# Patient Record
Sex: MALE | Race: WHITE | Employment: FULL TIME | URBAN - METROPOLITAN AREA
[De-identification: names, ages, dates, MRNs, and addresses within clinical notes are randomized per-mention and may not be internally consistent; named-entity substitution may affect disease eponyms.]

---

## 2018-09-03 ENCOUNTER — HOSPITAL ENCOUNTER (EMERGENCY)
Facility: CLINIC | Age: 23
Discharge: HOME | End: 2018-09-03
Attending: FAMILY MEDICINE | Admitting: FAMILY MEDICINE
Payer: COMMERCIAL

## 2018-09-03 VITALS
HEART RATE: 79 BPM | TEMPERATURE: 98.6 F | SYSTOLIC BLOOD PRESSURE: 141 MMHG | BODY MASS INDEX: 32.41 KG/M2 | WEIGHT: 210 LBS | RESPIRATION RATE: 14 BRPM | OXYGEN SATURATION: 99 % | DIASTOLIC BLOOD PRESSURE: 85 MMHG

## 2018-09-03 DIAGNOSIS — G56.02 CARPAL TUNNEL SYNDROME OF LEFT WRIST: ICD-10-CM

## 2018-09-03 PROCEDURE — 99282 EMERGENCY DEPT VISIT SF MDM: CPT | Mod: Z6 | Performed by: FAMILY MEDICINE

## 2018-09-03 PROCEDURE — 99282 EMERGENCY DEPT VISIT SF MDM: CPT | Performed by: FAMILY MEDICINE

## 2018-09-03 NOTE — ED PROVIDER NOTES
History     Chief Complaint   Patient presents with     Numbness     awoke today with a numb hand. No pain.      HPI  Gustavo Chappell is a 23 year old male, past medical history significant for constipation, persistent viral warts, began 2 days ago presents to the emergency department with concerns of numbness involving his left hand upon awakening today.  He has had numbness in his hands occasionally upon awakening if he sleeps on them incorrectly.  He is a left-hand dominant .  Has never noticed the symptoms when driving.  When he awoke this morning his entire left hand felt numb and this has decreased to only involve his fourth and fifth digits.  He notes no loss of strength.  There is no associated headache visual change, nausea, vomiting, chest pain shortness of air, etc.      Problem List:    Patient Active Problem List    Diagnosis Date Noted     Constipation 04/13/2006     Priority: Medium     Problem list name updated by automated process. Provider to review       Abdominal pain, generalized 04/13/2006     Priority: Medium     Viral warts 02/02/2006     Priority: Medium     Problem list name updated by automated process. Provider to review       Other specified disorder of skin 02/02/2006     Priority: Medium        Past Medical History:    Past Medical History:   Diagnosis Date     Allergic urticaria 2004       Past Surgical History:    No past surgical history on file.    Family History:    Family History   Problem Relation Age of Onset     Diabetes Father      Hypertension Father      Lipids Father      Cancer Paternal Grandmother      Lung cancer     Cerebrovascular Disease Paternal Grandfather      Neurologic Disorder Paternal Grandfather      parkinson's     Hypertension Maternal Grandmother      Hypertension Maternal Grandfather        Social History:  Marital Status:  Single [1]  Social History   Substance Use Topics     Smoking status: Never Smoker     Smokeless tobacco: Not on  file     Alcohol use No        Medications:      NO ACTIVE MEDICATIONS         Review of Systems   All other systems reviewed and are negative.      Physical Exam   BP: 141/85  Pulse: 79  Temp: 98.6  F (37  C)  Resp: 14  Weight: 95.3 kg (210 lb)  SpO2: 99 %      Physical Exam   Constitutional: He is oriented to person, place, and time. He appears well-developed and well-nourished.   HENT:   Head: Normocephalic and atraumatic.   Right Ear: External ear normal.   Left Ear: External ear normal.   Nose: Nose normal.   Mouth/Throat: Oropharynx is clear and moist.   Eyes: Conjunctivae and EOM are normal. Pupils are equal, round, and reactive to light.   Neck: Normal range of motion. Neck supple.   Cardiovascular: Normal rate, regular rhythm, normal heart sounds and intact distal pulses.    Musculoskeletal:   There is no muscular atrophy in the hands, no fasciculation.  Tinel's test is positive on the left side.  Phalen's test is positive on the left side as well.   Neurological: He is alert and oriented to person, place, and time. He has normal reflexes. He displays normal reflexes. No cranial nerve deficit. He exhibits normal muscle tone. Coordination normal.   Reflex Scores:       Tricep reflexes are 2+ on the right side and 2+ on the left side.       Bicep reflexes are 2+ on the right side and 2+ on the left side.       Brachioradialis reflexes are 2+ on the right side and 2+ on the left side.       Patellar reflexes are 2+ on the right side and 2+ on the left side.       Achilles reflexes are 2+ on the right side and 2+ on the left side.  Nursing note and vitals reviewed.      ED Course     ED Course     Procedures               Critical Care time:  none               No results found for this or any previous visit (from the past 24 hour(s)).    Medications - No data to display  1:24 PM  23-year-old male who presents for evaluation of left hand numbness as discussed in the HPI.  Physical exam is notable for positive  Tinel's and Phalen's test on the left side.  Differential diagnostic considerations are reviewed with the patient.  I think the most likely explanation for his presentation today relates to transient compression of the risk, carpal tunnel type compression.  He has had symptoms like this in the past, nothing problematic this is the most prolonged episode.  I recommended that he simply try a carpal tunnel brace with sleeping, and his symptoms are progressive he should follow-up in clinic with his primary care provider for further evaluation potential hand surgery referral.  Assessments & Plan (with Medical Decision Making)   Assessment and plan with medical decision making at the time stamp above.      Disclaimer: This note consists of symbols derived from keyboarding, dictation and/or voice recognition software. As a result, there may be errors in the script that have gone undetected. Please consider this when interpreting information found in this chart.      I have reviewed the nursing notes.    I have reviewed the findings, diagnosis, plan and need for follow up with the patient.          New Prescriptions    No medications on file       Final diagnoses:   Carpal tunnel syndrome of left wrist       9/3/2018   [facility] EMERGENCY DEPARTMENT     Dalton Gtz MD  09/03/18 4308

## 2018-09-03 NOTE — ED AVS SNAPSHOT
[facility] Emergency Department    [address] [address]    Phone:  [phone]    Fax:  [phone]                                       Gustavo Chappell   MRN: [id number]    Department:  [facility] Emergency Department   Date of Visit:  9/3/2018           Patient Information     Date Of Birth          1995        Your diagnoses for this visit were:     Carpal tunnel syndrome of left wrist        You were seen by Dalton Gtz MD.      Follow-up Information     Schedule an appointment as soon as possible for a visit with Livan Burden MD.    Specialty:  Family Practice    Why:  As needed, If symptoms worsen    Contact information:    [address] [facility] [address]  [phone]          Discharge Instructions         Carpal Tunnel Syndrome Prevention Tips  Carpal tunnel syndrome is a painful condition in the hand and wrist. It occurs when there is too much pressure on the median nerve at the wrist. The median nerve runs from your forearm to the palm of your hand. It may get squeezed or pressed when it passes through the carpal tunnel from your wrist to your hand. You may then feel numbness, tingling, pain, or weakness in your hand and up your forearm.  Doing the same hand activities over and over can put you at higher risk for carpal tunnel syndrome. But you can reduce your risk. Learn how to change the way you use your hands. Below are tips for at home and on the job. Also follow the hand and wrist safety policies at your workplace.      Keep your wrist in a straight (neutral) position when exercising.      Keep your wrist in neutral  Keep a straight (neutral) wrist position as often as you can. Don t use your wrist in a bent (flexed) position for long periods of time. This includes extended or twisted positions.  When you sleep, don't have your wrist flexed (don't sleep all curled up on your side). And don't put extra pressure on your wrist for long  periods of time (don't sleep on your stomach with your hands under you).  Watch your   Don t use only your thumb and index finger to grasp or lift something. This can put stress on your wrist. When you can, use your whole hand and all its fingers to grasp an object.  Minimize repetition  Don t move your arms or hands the same way for long periods of time. And don't hold an object in the same way for long periods of time. Even simple, light tasks can cause injury this way. Instead, switch tasks or switch hands.  Rest your hands  Give your hands a break from time to time with a rest. Even a few minutes once an hour can help.  Reduce speed and force  Slow down when you do a forceful, repetitive motion. This gives your wrist time to recover from the effort. Use power tools to help reduce the force.  Strengthen the muscles  Weak muscles may lead to a poor wrist or arm position. Exercises will make your hand and arm muscles stronger. This can help you keep a better position.  Date Last Reviewed: 1/1/2018 2000-2017 [employer]. [address] [place] [address]. All rights reserved. This information is not intended as a substitute for professional medical care. Always follow your healthcare professional's instructions.          Understanding Carpal Tunnel Syndrome    The carpal tunnel is a narrow space inside the wrist. It is ringed by bone and a band of tough tissue called the transverse carpal ligament. A major nerve called the median nerve runs from the forearm into the hand through the carpal tunnel. Tendons also run through the carpal tunnel.  With carpal tunnel syndrome, the tendons or nearby tissues within the carpal tunnel may swell or thicken. Or the transverse carpal ligament may harden and shorten. This narrows the space in the carpal tunnel and puts pressure on the median nerve. This pressure leads to tingling and numbness of the hand and wrist. In time, the condition can make even  simple tasks hard to do.  What causes carpal tunnel syndrome?  Doctors aren t entirely clear why the condition occurs. Certain things may make a person more likely to have it. These include:    Being female    Being pregnant    Being overweight    Having diabetes or rheumatoid arthritis  Symptoms of carpal tunnel syndrome  Symptoms often come and go. At first, symptoms may occur mainly at night. Later, they may be noticed during the day as well. They may get worse with activities such as driving, reading, typing, or holding a phone. Symptoms can include:    Tingling and numbness in the hand or wrist    Sharp pain that shoots up the arm or down to the fingers    Hand stiffness or cramping, especially in the morning    Trouble making a fist    Hand weakness and clumsiness  Treatment for carpal tunnel syndrome  Certain treatments help reduce the pressure on the median nerve and relieve symptoms. Choices for treatment may include one or more of the following:    Wrist splint. This involves wearing a special brace on the wrist and hand. The splint holds the wrist straight, in a neutral position. This helps keep the carpal tunnel as open as possible.    Cortisone shots. Cortisone is a medicine that helps reduce swelling. It is injected directly into the wrist. It helps shrink tissues inside the carpal tunnel. This relieves symptoms for a time.    Pain medicines. You may take over-the-counter or prescription medicines to help reduce swelling and relieve symptoms.    Surgery. If the condition doesn t respond to other treatments and doesn t go away on its own, you may need surgery. During surgery, the surgeon cuts the transverse carpal ligament to relieve pressure on the median nerve.     When to call your healthcare provider  Call your healthcare provider right away if you have any of these:    Fever of 100.4 F (38 C) or higher, or as directed    Symptoms that don t get better, or get worse    New symptoms   Date Last  Reviewed: 3/10/2016    0352-8583 [employer]. [address] [place] [address], [place] [address]. All rights reserved. This information is not intended as a substitute for professional medical care. Always follow your healthcare professional's instructions.      Carpal tunnel wrist splint on an as-needed basis.  If you find that symptoms are recurrent or disruptive please make an appointment with primary care provider for further evaluation and potential hand surgery referral.    24 Hour Appointment Hotline       To make an appointment at any [facility] clinic, call [phone] ([phone]). If you don't have a family doctor or clinic, we will help you find one. [facility] clinics are conveniently located to serve the needs of you and your family.             Review of your medicines      Our records show that you are taking the medicines listed below. If these are incorrect, please call your family doctor or clinic.        Dose / Directions Last dose taken    NO ACTIVE MEDICATIONS        .   Refills:  0                Orders Needing Specimen Collection     None      Pending Results     No orders found from 9/1/2018 to 9/4/2018.            Pending Culture Results     No orders found from 9/1/2018 to 9/4/2018.            Pending Results Instructions     If you had any lab results that were not finalized at the time of your Discharge, you can call the ED Lab Result RN at [phone]. You will be contacted by this team for any positive Lab results or changes in treatment. The nurses are available 7 days a week from 10A to 6:30P.  You can leave a message 24 hours per day and they will return your call.        Test Results From Your Hospital Stay               Thank you for choosing [facility]       Thank you for choosing [facility] for your care. Our goal is always to provide you with excellent care. Hearing back from our patients is one way we can continue to improve our services. Please take a few minutes to  "complete the written survey that you may receive in the mail after you visit with us. Thank you!        [employer] Information     E[employer] lets you send messages to your doctor, view your test results, renew your prescriptions, schedule appointments and more. To sign up, go to [email or web address] . Click on \"Log in\" on the left side of the screen, which will take you to the Welcome page. Then click on \"Sign up Now\" on the right side of the page.     You will be asked to enter the access code listed below, as well as some personal information. Please follow the directions to create your username and password.     Your access code is: [email or web address]  Expires: 2018  1:21 PM     Your access code will  in 90 days. If you need help or a new code, please call your [facility] clinic or [phone].        Care EveryWhere ID     This is your Care EveryWhere ID. This could be used by other organizations to access your [facility] medical records  [id number]        Equal Access to Services     [facility]: Hadii zehra hernandezo Sojuju, waaxda luqadaha, qaybta kaalmacurt aderell, alma brown . So [facility] [phone].    ATENCIÓN: Si habla español, tiene a saleh disposición servicios gratuitos de asistencia lingüística. Llame al [phone].    We comply with applicable federal civil rights laws and [place] laws. We do not discriminate on the basis of race, color, national origin, age, disability, sex, sexual orientation, or gender identity.            After Visit Summary       This is your record. Keep this with you and show to your community pharmacist(s) and doctor(s) at your next visit.                  "

## 2018-09-03 NOTE — DISCHARGE INSTRUCTIONS
Carpal Tunnel Syndrome Prevention Tips  Carpal tunnel syndrome is a painful condition in the hand and wrist. It occurs when there is too much pressure on the median nerve at the wrist. The median nerve runs from your forearm to the palm of your hand. It may get squeezed or pressed when it passes through the carpal tunnel from your wrist to your hand. You may then feel numbness, tingling, pain, or weakness in your hand and up your forearm.  Doing the same hand activities over and over can put you at higher risk for carpal tunnel syndrome. But you can reduce your risk. Learn how to change the way you use your hands. Below are tips for at home and on the job. Also follow the hand and wrist safety policies at your workplace.      Keep your wrist in a straight (neutral) position when exercising.      Keep your wrist in neutral  Keep a straight (neutral) wrist position as often as you can. Don t use your wrist in a bent (flexed) position for long periods of time. This includes extended or twisted positions.  When you sleep, don't have your wrist flexed (don't sleep all curled up on your side). And don't put extra pressure on your wrist for long periods of time (don't sleep on your stomach with your hands under you).  Watch your   Don t use only your thumb and index finger to grasp or lift something. This can put stress on your wrist. When you can, use your whole hand and all its fingers to grasp an object.  Minimize repetition  Don t move your arms or hands the same way for long periods of time. And don't hold an object in the same way for long periods of time. Even simple, light tasks can cause injury this way. Instead, switch tasks or switch hands.  Rest your hands  Give your hands a break from time to time with a rest. Even a few minutes once an hour can help.  Reduce speed and force  Slow down when you do a forceful, repetitive motion. This gives your wrist time to recover from the effort. Use power tools to  help reduce the force.  Strengthen the muscles  Weak muscles may lead to a poor wrist or arm position. Exercises will make your hand and arm muscles stronger. This can help you keep a better position.  Date Last Reviewed: 1/1/2018 2000-2017 [employer]. [address] [place] [address]. All rights reserved. This information is not intended as a substitute for professional medical care. Always follow your healthcare professional's instructions.          Understanding Carpal Tunnel Syndrome    The carpal tunnel is a narrow space inside the wrist. It is ringed by bone and a band of tough tissue called the transverse carpal ligament. A major nerve called the median nerve runs from the forearm into the hand through the carpal tunnel. Tendons also run through the carpal tunnel.  With carpal tunnel syndrome, the tendons or nearby tissues within the carpal tunnel may swell or thicken. Or the transverse carpal ligament may harden and shorten. This narrows the space in the carpal tunnel and puts pressure on the median nerve. This pressure leads to tingling and numbness of the hand and wrist. In time, the condition can make even simple tasks hard to do.  What causes carpal tunnel syndrome?  Doctors aren t entirely clear why the condition occurs. Certain things may make a person more likely to have it. These include:    Being female    Being pregnant    Being overweight    Having diabetes or rheumatoid arthritis  Symptoms of carpal tunnel syndrome  Symptoms often come and go. At first, symptoms may occur mainly at night. Later, they may be noticed during the day as well. They may get worse with activities such as driving, reading, typing, or holding a phone. Symptoms can include:    Tingling and numbness in the hand or wrist    Sharp pain that shoots up the arm or down to the fingers    Hand stiffness or cramping, especially in the morning    Trouble making a fist    Hand weakness and  clumsiness  Treatment for carpal tunnel syndrome  Certain treatments help reduce the pressure on the median nerve and relieve symptoms. Choices for treatment may include one or more of the following:    Wrist splint. This involves wearing a special brace on the wrist and hand. The splint holds the wrist straight, in a neutral position. This helps keep the carpal tunnel as open as possible.    Cortisone shots. Cortisone is a medicine that helps reduce swelling. It is injected directly into the wrist. It helps shrink tissues inside the carpal tunnel. This relieves symptoms for a time.    Pain medicines. You may take over-the-counter or prescription medicines to help reduce swelling and relieve symptoms.    Surgery. If the condition doesn t respond to other treatments and doesn t go away on its own, you may need surgery. During surgery, the surgeon cuts the transverse carpal ligament to relieve pressure on the median nerve.     When to call your healthcare provider  Call your healthcare provider right away if you have any of these:    Fever of 100.4 F (38 C) or higher, or as directed    Symptoms that don t get better, or get worse    New symptoms   Date Last Reviewed: 3/10/2016    7548-9165 [employer]. [address] [place] [address]. All rights reserved. This information is not intended as a substitute for professional medical care. Always follow your healthcare professional's instructions.      Carpal tunnel wrist splint on an as-needed basis.  If you find that symptoms are recurrent or disruptive please make an appointment with primary care provider for further evaluation and potential hand surgery referral.

## 2018-09-03 NOTE — ED TRIAGE NOTES
"Reports waking with a \"numb hand this morning.\" Left hand. Also reports being hit with a box in left shoulder on Friday while at work.     "

## 2018-09-03 NOTE — ED AVS SNAPSHOT
[facility] Emergency Department    [address] [address]    Phone:  [phone]    Fax:  [phone]                                       Gustavo Chappell   MRN: [id number]    Department:  [facility] Emergency Department   Date of Visit:  9/3/2018           After Visit Summary Signature Page     I have received my discharge instructions, and my questions have been answered. I have discussed any challenges I see with this plan with the nurse or doctor.    ..........................................................................................................................................  Patient/Patient Representative Signature      ..........................................................................................................................................  Patient Representative Print Name and Relationship to Patient    ..................................................               ................................................  Date                                            Time    ..........................................................................................................................................  Reviewed by Signature/Title    ...................................................              ..............................................  Date                                                            Time          22EPIC Rev 08/18

## 2018-09-03 NOTE — ED NOTES
"Woke up this a.m. With numbness and tingling in whole left hand. Currently numbess only in left ring and pinky finger. Denies any recent injury or trauma. Was \"selina and drank a lot last night, could have slept on it wrong, did wake up on left side.\"  No history of carpal tunnel or tendonitis. 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  "

## 2018-09-09 ENCOUNTER — HEALTH MAINTENANCE LETTER (OUTPATIENT)
Age: 23
End: 2018-09-09

## 2018-09-30 ENCOUNTER — HEALTH MAINTENANCE LETTER (OUTPATIENT)
Age: 23
End: 2018-09-30

## 2019-03-06 ENCOUNTER — OFFICE VISIT (OUTPATIENT)
Dept: FAMILY MEDICINE | Facility: CLINIC | Age: 24
End: 2019-03-06
Payer: COMMERCIAL

## 2019-03-06 VITALS
HEIGHT: 68 IN | HEART RATE: 64 BPM | BODY MASS INDEX: 31.07 KG/M2 | SYSTOLIC BLOOD PRESSURE: 130 MMHG | RESPIRATION RATE: 16 BRPM | TEMPERATURE: 99.1 F | WEIGHT: 205 LBS | OXYGEN SATURATION: 98 % | DIASTOLIC BLOOD PRESSURE: 80 MMHG

## 2019-03-06 DIAGNOSIS — Z23 ENCOUNTER FOR IMMUNIZATION: ICD-10-CM

## 2019-03-06 DIAGNOSIS — Z00.00 ROUTINE HISTORY AND PHYSICAL EXAMINATION OF ADULT: Primary | ICD-10-CM

## 2019-03-06 PROCEDURE — 90471 IMMUNIZATION ADMIN: CPT | Performed by: FAMILY MEDICINE

## 2019-03-06 PROCEDURE — 99395 PREV VISIT EST AGE 18-39: CPT | Performed by: FAMILY MEDICINE

## 2019-03-06 PROCEDURE — 90715 TDAP VACCINE 7 YRS/> IM: CPT | Performed by: FAMILY MEDICINE

## 2019-03-06 ASSESSMENT — MIFFLIN-ST. JEOR: SCORE: 1901.43

## 2019-03-06 NOTE — PATIENT INSTRUCTIONS
Preventive Health Recommendations  Male Ages 21 - 25     Yearly exam:             See your health care provider every year in order to  o   Review health changes.   o   Discuss preventive care.    o   Review your medicines if your doctor has prescribed any.    You should be tested each year for STDs (sexually transmitted diseases).     Talk to your provider about cholesterol testing.      If you are at risk for diabetes, you should have a diabetes test (fasting glucose).    Shots: Get a flu shot each year. Get a tetanus shot every 10 years.     Nutrition:    Eat at least 5 servings of fruits and vegetables daily.     Eat whole-grain bread, whole-wheat pasta and brown rice instead of white grains and rice.     Get adequate calcium and Vitamin D.     Lifestyle    Exercise for at least 150 minutes a week (30 minutes a day, 5 days a week). This will help you control your weight and prevent disease.     Limit alcohol to one drink per day.     No smoking.     Wear sunscreen to prevent skin cancer.     See your dentist every six months for an exam and cleaning.           Thank you for choosing [facility].  You may be receiving a survey in the mail from Wanda Julio regarding your visit today.  Please take a few minutes to complete and return the survey to let us know how we are doing.      If you have questions or concerns, please contact us via Cswitch or you can contact your care team at [phone].    Our Clinic hours are:  Monday 6:40 am  to 7:00 pm  Tuesday -Friday 6:40 am to 5:00 pm    The [place] outpatient lab hours are:  Monday - Friday 6:10 am to 4:45 pm  Saturdays 7:00 am to 11:00 am  Appointments are required, call [phone]    If you have clinical questions after hours or would like to schedule an appointment,  call the clinic at [phone].    ASSESSMENT/PLAN:   (Z00.00) Routine history and physical examination of adult  (primary encounter diagnosis)  Comment:   Plan:   COUNSELING:  Reviewed  "preventive health counseling, as reflected in patient instructions       Regular exercise       Healthy diet/nutrition       Vision screening    BP Readings from Last 1 Encounters:   03/06/19 144/84     Estimated body mass index is 31.63 kg/m  as calculated from the following:    Height as of this encounter: 1.715 m (5' 7.5\").    Weight as of this encounter: 93 kg (205 lb).   reports that  has never smoked. He uses smokeless tobacco.     Counseling Resources:  ATP IV Guidelines  Pooled Cohorts Equation Calculator  FRAX Risk Assessment  ICSI Preventive Guidelines  Dietary Guidelines for Americans, 2010  USDA's MyPlate  ASA Prophylaxis  Lung CA Screening  Monitor and record the BP at rest and the goal for the average should be under 130/80.   Use the non drug therapies.       "

## 2019-03-06 NOTE — PROGRESS NOTES
SUBJECTIVE:   CC: Gustavo Chappell is an 23 year old male who presents for preventive health visit.     Healthy Habits:    Do you get at least three servings of calcium containing foods daily (dairy, green leafy vegetables, etc.)? yes    Amount of exercise or daily activities, outside of work: Active with his work.    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    Do you see a dentist twice per year? yes    Do you have sleep apnea, excessive snoring or daytime drowsiness?no      Chief Complaint   Patient presents with     Physical     General physical exam.       Today's PHQ-2 Score:   PHQ-2 ( 1999 Pfizer) 3/6/2019   Q1: Little interest or pleasure in doing things 0   Q2: Feeling down, depressed or hopeless 0   PHQ-2 Score 0       Abuse: Current or Past(Physical, Sexual or Emotional)- No  Do you feel safe in your environment? Yes    Social History     Tobacco Use     Smoking status: Never Smoker     Smokeless tobacco: Current User     Tobacco comment: 1 tin per week.   Substance Use Topics     Alcohol use: Yes     Comment: During the weekends.     If you drink alcohol do you typically have >3 drinks per day or >7 drinks per week? Yes - AUDIT SCORE:  5                      Reviewed orders with patient. Reviewed health maintenance and updated orders accordingly - Yes    Reviewed and updated as needed this visit by clinical staff  Tobacco  Allergies  Meds  Med Hx  Surg Hx  Fam Hx  Soc Hx      Reviewed and updated as needed this visit by Provider    His home BP at rest is normal.     ROS:  CONSTITUTIONAL: NEGATIVE for fever, chills, change in weight  INTEGUMENTARY/SKIN: NEGATIVE for worrisome rashes, moles or lesions  EYES: NEGATIVE for vision changes or irritation  ENT: NEGATIVE for ear, mouth and throat problems  RESP: NEGATIVE for significant cough or SOB  CV: NEGATIVE for chest pain,  "palpitations or peripheral edema  GI: NEGATIVE for nausea, abdominal pain, heartburn, or change in bowel habits   male: negative for dysuria, hematuria, decreased urinary stream, erectile dysfunction, urethral discharge  MUSCULOSKELETAL: NEGATIVE for significant arthralgias or myalgia  NEURO: NEGATIVE for weakness, dizziness or paresthesias  PSYCHIATRIC: NEGATIVE for changes in mood or affect    OBJECTIVE:   /80   Pulse 64   Temp 99.1  F (37.3  C) (Tympanic)   Resp 16   Ht 1.715 m (5' 7.5\")   Wt 93 kg (205 lb)   SpO2 98%   BMI 31.63 kg/m    EXAM:  GENERAL APPEARANCE: healthy, alert and no distress  EYES: EOMI,  PERRL  HENT: ear canals and TM's normal and nose and mouth without ulcers or lesions  NECK: no adenopathy, no asymmetry, masses, or scars and thyroid normal to palpation  RESP: lungs clear to auscultation - no rales, rhonchi or wheezes  CV: regular rates and rhythm, normal S1 S2, no S3 or S4 and no murmur, click or rub -  ABDOMEN:  soft, nontender, no HSM or masses and bowel sounds normal  GU_male: testicles normal without atrophy or masses, no hernias and penis normal without urethral discharge  MS: extremities normal- no gross deformities noted, no evidence of inflammation in joints, FROM in all extremities.  SKIN: no suspicious lesions or rashes  NEURO: Normal strength and tone, sensory exam grossly normal, mentation intact and speech normal  PSYCH: mentation appears normal and affect normal/bright  LYMPHATICS: No axillary, cervical, inguinal, or supraclavicular nodes      ASSESSMENT/PLAN:   (Z00.00) Routine history and physical examination of adult  (primary encounter diagnosis)  Comment:   Plan:   COUNSELING:  Reviewed preventive health counseling, as reflected in patient instructions       Regular exercise       Healthy diet/nutrition       Vision screening    BP Readings from Last 1 Encounters:   03/06/19 130/80     Estimated body mass index is 31.63 kg/m  as calculated from the " "following:    Height as of this encounter: 1.715 m (5' 7.5\").    Weight as of this encounter: 93 kg (205 lb).   reports that  has never smoked. He uses smokeless tobacco.     Counseling Resources:  ATP IV Guidelines  Pooled Cohorts Equation Calculator  FRAX Risk Assessment  ICSI Preventive Guidelines  Dietary Guidelines for Americans, 2010  USDA's MyPlate  ASA Prophylaxis  Lung CA Screening  Monitor and record the BP at rest and the goal for the average should be under 130/80.   Use the non drug therapies.     Jose Monreal MD  [facility]  "

## 2019-03-06 NOTE — NURSING NOTE
Screening Questionnaire for Adult Immunization    Are you sick today?   No   Do you have allergies to medications, food, a vaccine component or latex?   No   Have you ever had a serious reaction after receiving a vaccination?   No   Do you have a long-term health problem with heart disease, lung disease, asthma, kidney disease, metabolic disease (e.g. diabetes), anemia, or other blood disorder?   No   Do you have cancer, leukemia, HIV/AIDS, or any other immune system problem?   No   In the past 3 months, have you taken medications that affect  your immune system, such as prednisone, other steroids, or anticancer drugs; drugs for the treatment of rheumatoid arthritis, Crohn s disease, or psoriasis; or have you had radiation treatments?   No   Have you had a seizure, or a brain or other nervous system problem?   No   During the past year, have you received a transfusion of blood or blood     products, or been given immune (gamma) globulin or antiviral drug?   No   For women: Are you pregnant or is there a chance you could become        pregnant during the next month?   No   Have you received any vaccinations in the past 4 weeks?   No     Immunization questionnaire answers were all negative.        Per orders of Dr. Monreal, injection of Adacel given by Cintia Tyler. Patient instructed to remain in clinic for 15 minutes afterwards, and to report any adverse reaction to me immediately.       Screening performed by Cintia Tyler on 3/6/2019 at 10:35 AM.

## 2021-06-30 ENCOUNTER — IMMUNIZATION (OUTPATIENT)
Dept: FAMILY MEDICINE | Facility: CLINIC | Age: 26
End: 2021-06-30
Payer: OTHER GOVERNMENT

## 2021-06-30 PROCEDURE — 91301 PR COVID VAC MODERNA 100 MCG/0.5 ML IM: CPT

## 2021-06-30 PROCEDURE — 0011A PR COVID VAC MODERNA 100 MCG/0.5 ML IM: CPT

## 2021-07-25 ENCOUNTER — HEALTH MAINTENANCE LETTER (OUTPATIENT)
Age: 26
End: 2021-07-25

## 2021-07-28 ENCOUNTER — IMMUNIZATION (OUTPATIENT)
Dept: FAMILY MEDICINE | Facility: CLINIC | Age: 26
End: 2021-07-28
Attending: FAMILY MEDICINE
Payer: COMMERCIAL

## 2021-07-28 DIAGNOSIS — Z23 HIGH PRIORITY FOR 2019-NCOV VACCINE: Primary | ICD-10-CM

## 2021-07-28 PROCEDURE — 91301 COVID-19,PF,MODERNA: CPT

## 2021-07-28 PROCEDURE — 0012A COVID-19,PF,MODERNA: CPT

## 2021-09-19 ENCOUNTER — HEALTH MAINTENANCE LETTER (OUTPATIENT)
Age: 26
End: 2021-09-19

## 2022-08-21 ENCOUNTER — HEALTH MAINTENANCE LETTER (OUTPATIENT)
Age: 27
End: 2022-08-21

## 2022-11-14 ENCOUNTER — TELEPHONE (OUTPATIENT)
Dept: FAMILY MEDICINE | Facility: CLINIC | Age: 27
End: 2022-11-14

## 2022-11-14 NOTE — TELEPHONE ENCOUNTER
Patient's wife called requesting an appointment for patient as he has a sore throat for 10 days.  Patient had micaela like symptoms that started 10 days ago and now only has sore throat.  Afebrile, denies chest pain or SOA.  Reviewed home care measures.  Advised UC if symptoms worsen.  Patient does need to establish care as last visit 3/2019.  Araceli Paul RN

## 2022-11-14 NOTE — TELEPHONE ENCOUNTER
You can offer the same day on Wednesday at 4 pm with me or he can try to see a colleague.  Livan Burden MD  Family Medicine

## 2022-12-20 ENCOUNTER — OFFICE VISIT (OUTPATIENT)
Dept: FAMILY MEDICINE | Facility: CLINIC | Age: 27
End: 2022-12-20
Payer: COMMERCIAL

## 2022-12-20 VITALS
TEMPERATURE: 98.3 F | OXYGEN SATURATION: 97 % | BODY MASS INDEX: 39.1 KG/M2 | HEART RATE: 84 BPM | SYSTOLIC BLOOD PRESSURE: 130 MMHG | WEIGHT: 258 LBS | DIASTOLIC BLOOD PRESSURE: 80 MMHG | HEIGHT: 68 IN | RESPIRATION RATE: 18 BRPM

## 2022-12-20 DIAGNOSIS — Z13.1 SCREENING FOR DIABETES MELLITUS: ICD-10-CM

## 2022-12-20 DIAGNOSIS — E66.812 CLASS 2 OBESITY DUE TO EXCESS CALORIES WITHOUT SERIOUS COMORBIDITY WITH BODY MASS INDEX (BMI) OF 39.0 TO 39.9 IN ADULT: ICD-10-CM

## 2022-12-20 DIAGNOSIS — Z00.00 ROUTINE GENERAL MEDICAL EXAMINATION AT A HEALTH CARE FACILITY: Primary | ICD-10-CM

## 2022-12-20 DIAGNOSIS — E66.09 CLASS 2 OBESITY DUE TO EXCESS CALORIES WITHOUT SERIOUS COMORBIDITY WITH BODY MASS INDEX (BMI) OF 39.0 TO 39.9 IN ADULT: ICD-10-CM

## 2022-12-20 DIAGNOSIS — Z13.220 SCREENING CHOLESTEROL LEVEL: ICD-10-CM

## 2022-12-20 LAB
CHOLEST SERPL-MCNC: 160 MG/DL
HBA1C MFR BLD: 5.6 % (ref 0–5.6)
HDLC SERPL-MCNC: 41 MG/DL
LDLC SERPL CALC-MCNC: 79 MG/DL
NONHDLC SERPL-MCNC: 119 MG/DL
TRIGL SERPL-MCNC: 201 MG/DL

## 2022-12-20 PROCEDURE — 83036 HEMOGLOBIN GLYCOSYLATED A1C: CPT | Performed by: FAMILY MEDICINE

## 2022-12-20 PROCEDURE — 36415 COLL VENOUS BLD VENIPUNCTURE: CPT | Performed by: FAMILY MEDICINE

## 2022-12-20 PROCEDURE — 80061 LIPID PANEL: CPT | Performed by: FAMILY MEDICINE

## 2022-12-20 PROCEDURE — 99385 PREV VISIT NEW AGE 18-39: CPT | Performed by: FAMILY MEDICINE

## 2022-12-20 ASSESSMENT — ENCOUNTER SYMPTOMS
PALPITATIONS: 0
JOINT SWELLING: 0
NERVOUS/ANXIOUS: 0
CONSTIPATION: 0
WEAKNESS: 0
FEVER: 0
ARTHRALGIAS: 0
COUGH: 0
PARESTHESIAS: 0
HEMATURIA: 0
HEARTBURN: 0
EYE PAIN: 0
HEADACHES: 0
ABDOMINAL PAIN: 0
MYALGIAS: 0
CHILLS: 0
SHORTNESS OF BREATH: 0
DIARRHEA: 0
DIZZINESS: 0
DYSURIA: 0
HEMATOCHEZIA: 0
FREQUENCY: 0
NAUSEA: 0
SORE THROAT: 0

## 2022-12-20 ASSESSMENT — PAIN SCALES - GENERAL: PAINLEVEL: NO PAIN (0)

## 2022-12-20 NOTE — PROGRESS NOTES
SUBJECTIVE:   CC: Gustavo is an 27 year old who presents for preventative health visit.     Patient has been advised of split billing requirements and indicates understanding: Yes  Healthy Habits:     Getting at least 3 servings of Calcium per day:  Yes    Bi-annual eye exam:  Yes    Dental care twice a year:  Yes    Sleep apnea or symptoms of sleep apnea:  None    Diet:  Regular (no restrictions)    Frequency of exercise:  2-3 days/week    Duration of exercise:  15-30 minutes    Taking medications regularly:  Yes    Medication side effects:  Not applicable    PHQ-2 Total Score: 0    Additional concerns today:  No       27 year old male who presents to clinic for annual exam.       Tobacco use 3-4 tins per month.   Reports some months won't chew, other months he will.   Not interested in nicotine treatment.     Reports exercise has decreased after birth of son who is 7 months.       Today's PHQ-2 Score:   PHQ-2 ( 1999 Pfizer) 12/20/2022   Q1: Little interest or pleasure in doing things 0   Q2: Feeling down, depressed or hopeless 0   PHQ-2 Score 0   PHQ-2 Total Score (12-17 Years)- Positive if 3 or more points; Administer PHQ-A if positive -   Q1: Little interest or pleasure in doing things Not at all   Q2: Feeling down, depressed or hopeless Not at all   PHQ-2 Score 0       Have you ever done Advance Care Planning? (For example, a Health Directive, POLST, or a discussion with a medical provider or your loved ones about your wishes): No, advance care planning information given to patient to review.  Patient declined advance care planning discussion at this time.    Social History     Tobacco Use     Smoking status: Never     Smokeless tobacco: Current     Types: Chew     Tobacco comments:     2-3 tins a month   Substance Use Topics     Alcohol use: Yes     Comment: During the weekends.     If you drink alcohol do you typically have >3 drinks per day or >7 drinks per week? No    Alcohol Use 12/20/2022   Prescreen: >3  "drinks/day or >7 drinks/week? No   Prescreen: >3 drinks/day or >7 drinks/week? -   AUDIT SCORE  -       Last PSA: No results found for: PSA    Reviewed orders with patient. Reviewed health maintenance and updated orders accordingly - Yes      Reviewed and updated as needed this visit by clinical staff   Tobacco  Allergies  Meds  Problems  Med Hx  Surg Hx  Fam Hx          Reviewed and updated as needed this visit by Provider   Tobacco  Allergies  Meds  Problems  Med Hx  Surg Hx  Fam Hx           Review of Systems   Constitutional: Negative for chills and fever.   HENT: Negative for congestion, ear pain, hearing loss and sore throat.    Eyes: Negative for pain and visual disturbance.   Respiratory: Negative for cough and shortness of breath.    Cardiovascular: Negative for chest pain, palpitations and peripheral edema.   Gastrointestinal: Negative for abdominal pain, constipation, diarrhea, heartburn, hematochezia and nausea.   Genitourinary: Negative for dysuria, frequency, genital sores, hematuria, impotence, penile discharge and urgency.   Musculoskeletal: Negative for arthralgias, joint swelling and myalgias.   Skin: Negative for rash.   Neurological: Negative for dizziness, weakness, headaches and paresthesias.   Psychiatric/Behavioral: Negative for mood changes. The patient is not nervous/anxious.        OBJECTIVE:   /80 (BP Location: Right arm, Patient Position: Chair, Cuff Size: Adult Large)   Pulse 84   Temp 98.3  F (36.8  C) (Tympanic)   Resp 18   Ht 1.715 m (5' 7.5\")   Wt 117 kg (258 lb)   SpO2 97%   BMI 39.81 kg/m      Physical Exam  GENERAL: healthy, alert and no distress  EYES: Eyes grossly normal to inspection, PERRL and conjunctivae and sclerae normal  HENT: ear canals and TM's normal, nose and mouth without ulcers or lesions  NECK: no adenopathy, no asymmetry, masses, or scars and thyroid normal to palpation  RESP: lungs clear to auscultation - no rales, rhonchi or " wheezes  CV: regular rate and rhythm, normal S1 S2, no S3 or S4, no murmur, click or rub, no peripheral edema and peripheral pulses strong  ABDOMEN: soft, nontender, no hepatosplenomegaly, no masses and bowel sounds normal  MS: no gross musculoskeletal defects noted, no edema  SKIN: no suspicious lesions or rashes  NEURO: Normal strength and tone, mentation intact and speech normal  PSYCH: mentation appears normal, affect normal/bright      ASSESSMENT/PLAN:   Gustavo was seen today for physical and establish care.    Diagnoses and all orders for this visit:    Routine general medical examination at a health care facility  Obesity BMI 39  Encouraged healthy diet, exercise. Screen for diabetes and cholesterol today due to family history and BMI.     Screening for diabetes mellitus  -     Hemoglobin A1c    Screening cholesterol level  -     Lipid panel reflex to direct LDL Non-fasting    Patient has been advised of split billing requirements and indicates understanding: Yes      COUNSELING:   Reviewed preventive health counseling, as reflected in patient instructions       Regular exercise       Healthy diet/nutrition       Alcohol Use         He reports that he has never smoked. His smokeless tobacco use includes chew.            Mario Sebastian DO  [facility]

## 2022-12-20 NOTE — PATIENT INSTRUCTIONS
Lab work today.       Preventive Health Recommendations  Male Ages 21 - 25     Yearly exam:             See your health care provider every year in order to  o   Review health changes.   o   Discuss preventive care.    o   Review your medicines if your doctor has prescribed any.  You should be tested each year for STDs (sexually transmitted diseases).   Talk to your provider about cholesterol testing.    If you are at risk for diabetes, you should have a diabetes test (fasting glucose).    Shots: Get a flu shot each year. Get a tetanus shot every 10 years.     Nutrition:  Eat at least 5 servings of fruits and vegetables daily.   Eat whole-grain bread, whole-wheat pasta and brown rice instead of white grains and rice.   Get adequate calcium and Vitamin D.     Lifestyle  Exercise for at least 150 minutes a week (30 minutes a day, 5 days a week). This will help you control your weight and prevent disease.   Limit alcohol to one drink per day.   No smoking.   Wear sunscreen to prevent skin cancer.   See your dentist every six months for an exam and cleaning.

## 2022-12-21 PROBLEM — E66.812 CLASS 2 OBESITY DUE TO EXCESS CALORIES WITHOUT SERIOUS COMORBIDITY WITH BODY MASS INDEX (BMI) OF 39.0 TO 39.9 IN ADULT: Status: ACTIVE | Noted: 2022-12-21

## 2022-12-21 PROBLEM — E66.09 CLASS 2 OBESITY DUE TO EXCESS CALORIES WITHOUT SERIOUS COMORBIDITY WITH BODY MASS INDEX (BMI) OF 39.0 TO 39.9 IN ADULT: Status: ACTIVE | Noted: 2022-12-21

## 2023-08-01 ENCOUNTER — MYC MEDICAL ADVICE (OUTPATIENT)
Dept: FAMILY MEDICINE | Facility: CLINIC | Age: 28
End: 2023-08-01
Payer: COMMERCIAL

## 2023-08-04 ENCOUNTER — OFFICE VISIT (OUTPATIENT)
Dept: FAMILY MEDICINE | Facility: CLINIC | Age: 28
End: 2023-08-04
Payer: COMMERCIAL

## 2023-08-04 VITALS
RESPIRATION RATE: 16 BRPM | WEIGHT: 226 LBS | OXYGEN SATURATION: 99 % | DIASTOLIC BLOOD PRESSURE: 76 MMHG | HEART RATE: 54 BPM | SYSTOLIC BLOOD PRESSURE: 124 MMHG | TEMPERATURE: 98.8 F | HEIGHT: 69 IN | BODY MASS INDEX: 33.47 KG/M2

## 2023-08-04 DIAGNOSIS — Z87.891 PERSONAL HISTORY OF TOBACCO USE, PRESENTING HAZARDS TO HEALTH: ICD-10-CM

## 2023-08-04 DIAGNOSIS — K13.70 LESION OF ORAL MUCOSA: Primary | ICD-10-CM

## 2023-08-04 PROCEDURE — 99213 OFFICE O/P EST LOW 20 MIN: CPT | Performed by: NURSE PRACTITIONER

## 2023-08-04 RX ORDER — TRIAMCINOLONE ACETONIDE 0.1 %
PASTE (GRAM) DENTAL 2 TIMES DAILY
Qty: 5 G | Refills: 0 | Status: SHIPPED | OUTPATIENT
Start: 2023-08-04

## 2023-08-04 ASSESSMENT — PAIN SCALES - GENERAL: PAINLEVEL: NO PAIN (0)

## 2023-08-04 NOTE — PROGRESS NOTES
"  Assessment & Plan     Lesion of oral mucosa  ? Deep non healing aphthous lesion vs other   Personal history of chewing tobacco use X 6 years quit 2 month ago.  Follow-up with ENT if no improvement despite treatment/mouth rinses in 2-3 weeks.    - triamcinolone (KENALOG) 0.1 % paste  Dispense: 5 g; Refill: 0  - Adult ENT  Referral    Personal history of tobacco use, presenting hazards to health     - triamcinolone (KENALOG) 0.1 % paste  Dispense: 5 g; Refill: 0  - Adult ENT  Referral       BMI:   Estimated body mass index is 33.86 kg/m  as calculated from the following:    Height as of this encounter: 1.74 m (5' 8.5\").    Weight as of this encounter: 102.5 kg (226 lb).   Weight management plan: Patient was referred to their PCP to discuss a diet and exercise plan.    See Patient Instructions    Asha Chapa, [facility]    Sol Hancock is a 28 year old, presenting for the following health issues:  Mouth Problem (Lump under tongue. )        8/4/2023     8:57 AM   Additional Questions   Roomed by Ariella JOHNS   Accompanied by self         8/4/2023     8:57 AM   Patient Reported Additional Medications   Patient reports taking the following new medications no new meds       HPI     Lump under tongue  Onset: About 2 months   Description: noticed about 2 months ago, lump under tongue. Is irritated after touching it and trying to pop it the other day, did notice a little blood.   Pt states quit chewing tobacco in May, no other tobacco use. History of chewing tobacco 6 years.  Not a smoker  Denies recent fevers, illness.    Intensity: mild  Progression of Symptoms:  same  Accompanying Signs & Symptoms: no  Previous history of similar problem: no   Precipitating factors:        Worsened by: nothing  Alleviating factors:        Improved by: nothing  Therapies tried and outcome:  none     See's a dentist - normal exams except for reflux findings  Noticing occasional reflux " "symptoms     Review of Systems   Constitutional, HEENT, cardiovascular, pulmonary, GI, , musculoskeletal, neuro, skin, endocrine and psych systems are negative, except as otherwise noted.      Objective    /76   Pulse 54   Temp 98.8  F (37.1  C) (Tympanic)   Resp 16   Ht 1.74 m (5' 8.5\")   Wt 102.5 kg (226 lb)   SpO2 99%   BMI 33.86 kg/m    Body mass index is 33.86 kg/m .  Physical Exam   GENERAL: healthy, alert and no distress  HENT: ear canals and TM's normal, nose and mouth with 2-3 mm mildly erythematous tender firm non mobile lesion under tongue on right.  NECK: no adenopathy, no asymmetry, masses, or scars and thyroid normal to palpation  PSYCH: mentation appears normal, affect normal/bright                  "

## 2023-10-19 ENCOUNTER — OFFICE VISIT (OUTPATIENT)
Dept: FAMILY MEDICINE | Facility: CLINIC | Age: 28
End: 2023-10-19
Payer: COMMERCIAL

## 2023-10-19 VITALS
BODY MASS INDEX: 33.77 KG/M2 | DIASTOLIC BLOOD PRESSURE: 76 MMHG | SYSTOLIC BLOOD PRESSURE: 124 MMHG | HEART RATE: 75 BPM | OXYGEN SATURATION: 98 % | TEMPERATURE: 98.1 F | WEIGHT: 228 LBS | RESPIRATION RATE: 18 BRPM | HEIGHT: 69 IN

## 2023-10-19 DIAGNOSIS — G44.209 TENSION HEADACHE: Primary | ICD-10-CM

## 2023-10-19 DIAGNOSIS — H43.393 VITREOUS FLOATERS OF BOTH EYES: ICD-10-CM

## 2023-10-19 DIAGNOSIS — S16.1XXD STRAIN OF NECK MUSCLE, SUBSEQUENT ENCOUNTER: ICD-10-CM

## 2023-10-19 PROCEDURE — 99213 OFFICE O/P EST LOW 20 MIN: CPT | Performed by: STUDENT IN AN ORGANIZED HEALTH CARE EDUCATION/TRAINING PROGRAM

## 2023-10-19 RX ORDER — NAPROXEN 500 MG/1
500 TABLET ORAL 2 TIMES DAILY PRN
Qty: 60 TABLET | Refills: 0 | Status: SHIPPED | OUTPATIENT
Start: 2023-10-19

## 2023-10-19 ASSESSMENT — ENCOUNTER SYMPTOMS
FACIAL ASYMMETRY: 0
EYE PAIN: 1
NUMBNESS: 0
PALPITATIONS: 0
VOMITING: 0
PHOTOPHOBIA: 1
CONSTIPATION: 0
CHEST TIGHTNESS: 0
ABDOMINAL DISTENTION: 0
EYE DISCHARGE: 0
SINUS PAIN: 0
EYE ITCHING: 0
NAUSEA: 0
DIZZINESS: 0
DIARRHEA: 0
PARESTHESIAS: 0
EYE REDNESS: 0
HEADACHES: 1
SEIZURES: 0
ABDOMINAL PAIN: 0
HEMATOCHEZIA: 0
COUGH: 0
SHORTNESS OF BREATH: 0
TREMORS: 0
SINUS PRESSURE: 0
WEAKNESS: 0
SPEECH DIFFICULTY: 0

## 2023-10-19 ASSESSMENT — PAIN SCALES - GENERAL: PAINLEVEL: NO PAIN (1)

## 2023-10-19 NOTE — PROGRESS NOTES
1. Tension headache  > patient is unsure how best to describe his head pain states that it sometimes feels like a squeezing band over his head, states that on occasion he might sometimes notice some throbbing  -Patient states that in the last month he did not require the use of analgesics  -Patient states that Tylenol and ibuprofen did not really fully resolve his symptoms, suspect this may be secondary to small dose of medication  -Prescription sent for naproxen (NAPROSYN) 500 MG tablet; Take 1 tablet (500 mg) by mouth 2 times daily as needed for headaches  Dispense: 60 tablet; Refill: 0    2. Vitreous floaters of both eyes  > patient states he get floaters with bright light   - Adult Eye  Referral; Future    3. Strain of neck muscle, subsequent encounter  > suspect patient sustained injury from chiropractor   - has full range of motion at today's visit   - no abnormal findings on physical exam   - reassurance provided especially given the fact that he noted subjective improvement since the onset of symptoms     Follow-up plan:  - return to clinic as needed or sooner if symptoms worsen     Subjective   Santi is a 28 year old, presenting for the following health issues:  Neck Pain, Headache, and Health Maintenance (Received flu shot at work)        10/19/2023     8:27 AM   Additional Questions   Roomed by Maddy KENDRICK LPN   Accompanied by self         10/19/2023     8:27 AM   Patient Reported Additional Medications   Patient reports taking the following new medications no new meds       History of Present Illness       Headaches:   Since the patient's last clinic visit, headaches are: improved (first started in august 2023, went to chiropractor and things were improving and then after one particular adjustment he started having headaches, still having neck pain. Has stopped seeing the chiropractor on late September.)  The patient is getting headaches:  Daily  He is (has been having light sensitivity,  "currently pain level is 1/10 on pain scale, at it's worst it gets up to 4/10) able to do normal daily activities when he has a migraine.  The patient is taking the following rescue/relief medications:  No rescue/relief medications   Patient states \"The relief is inconsistent\" from the rescue/relief medications.   The patient is taking the following medications to prevent migraines:  No medications to prevent migraines  In the past 4 weeks, the patient has gone to an Urgent Care or Emergency Room 0 times times due to headaches.    Reason for visit:  Neck pain and headache  Symptom onset:  More than a month  Symptoms include:  Neck pain headache and vision  Symptom intensity:  Mild  Symptom progression:  Improving  Had these symptoms before:  No  What makes it worse:  Light  What makes it better:  Sunglasses    He eats 2-3 servings of fruits and vegetables daily.He consumes 0 sweetened beverage(s) daily.He exercises with enough effort to increase his heart rate 20 to 29 minutes per day.  He exercises with enough effort to increase his heart rate 5 days per week.   He is taking medications regularly.     Head Pain:   Occurs every other day, not debilitating, he had one last week that was \"worse\"   Characteristics: dull pain   Location: alternates between mostly the right lateral side and then the frontal aspect of his head \"it feels like I'm wearing a rubber band around my head\"   Duration: intermittent, will have it in the evening, when he wakes up it's gone   Onset: he had a bad migraine after his August adjustment and ever since then he has been noticing more head pain   Radiation: states the pain doesn't really radiate, mostly occurs in one spot   Alleviate: wearing sunglasses with the eyes   Worsening factors: bright light, perhaps lack of sleep, states lack of food or high stress levels do not necessarily affect his head pain   When he is really tired his eyes \"shake up and down\"   States he was taking " "Advil/ibuprofen 400mg which did not help   Tried Tylenol and Excedrin which also did not help     Eye symptoms:   Has floaters   Notices a lot when he's physically active, in the sunlight   \"I feel like I can still see fine\"   Doesn't know if there is a connection between his head pain and eye symptoms   Doesn't wear contacts or glasses   Never had eye surgery   Has a pair of prescription glasses for \"seeing things up close\"     Neck Pain:   Onset: right after the August adjustment he started feeling a click in his neck    States his symptoms are getting better now, but he can still hear clicking when he turns his head   Has full range of motion of neck   Pain is intermittent comes and goes, but states overall it is improving         Review of Systems   HENT:  Negative for congestion, sinus pressure, sinus pain and sneezing.    Eyes:  Positive for photophobia and pain. Negative for discharge, redness and itching.        Notices floaters but no vision loss    Respiratory:  Negative for cough, chest tightness and shortness of breath.    Cardiovascular:  Negative for chest pain and palpitations.   Gastrointestinal:  Negative for abdominal distention, abdominal pain, constipation, diarrhea, hematochezia, nausea and vomiting.   Neurological:  Positive for headaches. Negative for dizziness, tremors, seizures, syncope, facial asymmetry, speech difficulty, weakness, numbness and paresthesias.          Objective    /76 (BP Location: Left arm, Patient Position: Sitting, Cuff Size: Adult Large)   Pulse 75   Temp 98.1  F (36.7  C) (Tympanic)   Resp 18   Ht 1.74 m (5' 8.5\")   Wt 103.4 kg (228 lb)   SpO2 98%   BMI 34.16 kg/m    Body mass index is 34.16 kg/m .  Physical Exam  Constitutional:       General: He is not in acute distress.     Appearance: Normal appearance.   HENT:      Head: Normocephalic.      Right Ear: Tympanic membrane, ear canal and external ear normal. There is no impacted cerumen.      Left Ear: " Tympanic membrane, ear canal and external ear normal. There is no impacted cerumen.      Mouth/Throat:      Mouth: Mucous membranes are moist.      Pharynx: Oropharynx is clear. No oropharyngeal exudate or posterior oropharyngeal erythema.   Eyes:      General: No scleral icterus.        Right eye: No discharge.         Left eye: No discharge.      Extraocular Movements: Extraocular movements intact.      Conjunctiva/sclera: Conjunctivae normal.      Pupils: Pupils are equal, round, and reactive to light.   Pulmonary:      Effort: Pulmonary effort is normal. No respiratory distress.   Musculoskeletal:         General: Normal range of motion.      Cervical back: Normal range of motion and neck supple. No rigidity or tenderness.   Skin:     General: Skin is warm.      Findings: No rash.      Comments: No rash on exposed skin   Neurological:      General: No focal deficit present.      Mental Status: He is alert and oriented to person, place, and time.      Coordination: Coordination normal.      Gait: Gait normal.   Psychiatric:         Mood and Affect: Mood normal.         Behavior: Behavior normal.

## 2023-10-20 ENCOUNTER — MYC MEDICAL ADVICE (OUTPATIENT)
Dept: FAMILY MEDICINE | Facility: CLINIC | Age: 28
End: 2023-10-20
Payer: COMMERCIAL

## 2023-10-27 NOTE — TELEPHONE ENCOUNTER
Covering for primary/ordering provider  Patient will need an appointment to determine next step in work-up for follow-up in  absence

## 2023-11-08 ENCOUNTER — MYC MEDICAL ADVICE (OUTPATIENT)
Dept: FAMILY MEDICINE | Facility: CLINIC | Age: 28
End: 2023-11-08
Payer: COMMERCIAL

## 2023-11-09 NOTE — TELEPHONE ENCOUNTER
Left message for patient to return call to clinic re: mychart message.    Jovanna Mcneil RN  [facility]

## 2023-11-10 NOTE — TELEPHONE ENCOUNTER
Routed to provider, Dr Sebastian, in Dr Hernandez's absence.    I spoke with pt.  Pt is willing to be seen again for updated evaluation and develop plan of care.  Please see MyChart message from pt.  Pt saw Dr Sebastian 12/2022.  Ok to use a same day?    Rafaela Smith RN

## 2023-11-10 NOTE — TELEPHONE ENCOUNTER
Patient called and advised that  would like to have him schedule an appointment. Scheduled for 11/21/23.    Kourtney Roblero RN

## 2023-11-10 NOTE — TELEPHONE ENCOUNTER
I have never seen patient for this concern, so I am unable to comment on this recent issue.     Needs office visit for re-evaluation and discussion of next steps moving forward.

## 2023-11-21 ENCOUNTER — OFFICE VISIT (OUTPATIENT)
Dept: FAMILY MEDICINE | Facility: CLINIC | Age: 28
End: 2023-11-21
Payer: COMMERCIAL

## 2023-11-21 ENCOUNTER — ANCILLARY PROCEDURE (OUTPATIENT)
Dept: GENERAL RADIOLOGY | Facility: CLINIC | Age: 28
End: 2023-11-21
Attending: FAMILY MEDICINE
Payer: COMMERCIAL

## 2023-11-21 VITALS
BODY MASS INDEX: 35.16 KG/M2 | TEMPERATURE: 98.7 F | SYSTOLIC BLOOD PRESSURE: 130 MMHG | RESPIRATION RATE: 18 BRPM | HEIGHT: 68 IN | OXYGEN SATURATION: 99 % | HEART RATE: 70 BPM | WEIGHT: 232 LBS | DIASTOLIC BLOOD PRESSURE: 80 MMHG

## 2023-11-21 DIAGNOSIS — M54.2 NECK DISCOMFORT: ICD-10-CM

## 2023-11-21 DIAGNOSIS — M54.2 NECK DISCOMFORT: Primary | ICD-10-CM

## 2023-11-21 DIAGNOSIS — G44.209 TENSION HEADACHE: ICD-10-CM

## 2023-11-21 DIAGNOSIS — M62.89 MUSCLE TIGHTNESS: ICD-10-CM

## 2023-11-21 DIAGNOSIS — S46.819D STRAIN OF TRAPEZIUS MUSCLE, UNSPECIFIED LATERALITY, SUBSEQUENT ENCOUNTER: ICD-10-CM

## 2023-11-21 PROCEDURE — 99213 OFFICE O/P EST LOW 20 MIN: CPT | Performed by: FAMILY MEDICINE

## 2023-11-21 PROCEDURE — 72040 X-RAY EXAM NECK SPINE 2-3 VW: CPT | Mod: TC | Performed by: RADIOLOGY

## 2023-11-21 RX ORDER — TIZANIDINE 2 MG/1
2 TABLET ORAL 3 TIMES DAILY PRN
Qty: 40 TABLET | Refills: 1 | Status: SHIPPED | OUTPATIENT
Start: 2023-11-21

## 2023-11-21 ASSESSMENT — PAIN SCALES - GENERAL: PAINLEVEL: MODERATE PAIN (4)

## 2023-11-21 NOTE — PATIENT INSTRUCTIONS
Heat, hydrotherapy, ibuprofen as needed, tizanidine as needed for the neck discomfort and headaches.     Follow up with physical therapy.     Let me know if not improving and next steps would be to see Neurology.

## 2023-11-21 NOTE — PROGRESS NOTES
Assessment & Plan     Neck discomfort  Tension headache  Muscle tightness  Strain of trapezius muscle, unspecified laterality, subsequent encounter  -- symptoms likely related to tight musculature in the neck and trapezius muscles.   -- discussed conservative cares with rest, heat, hydrotherapy, topicals.   -- due to symptoms starting after chiropractor adjustment will obtain xray to rule out any occult fracture or malalignment of the spine. If xray reassuring likely related to the muscles.   -- utilize ibuprofen prn, and zanaflex as needed for muscle tightness.   -- follow up with physical therapy for home stretching and exercises regimen.   - XR Cervical Spine 2/3 Views  - Physical Therapy Referral  - tiZANidine (ZANAFLEX) 2 MG tablet  Dispense: 40 tablet; Refill: 1    The risks, benefits and treatment options of prescribed medications or other treatments have been discussed with the patient. The patient verbalized their understanding and should call or follow up if no improvement or if they develop further problems.      Mario Sebastian [facility]    Sol   Santi is a 28 year old, presenting for the following health issues:  Follow Up (Follow up on neck pain, headaches and vision from his appointment in October. ) and Imm/Inj (Declined Covid and HPV injections.)        11/21/2023    10:18 AM   Additional Questions   Roomed by Cintia Tyler CMA       History of Present Illness       Reason for visit:  Headache vision and neck    He eats 2-3 servings of fruits and vegetables daily.He consumes 0 sweetened beverage(s) daily.He exercises with enough effort to increase his heart rate 10 to 19 minutes per day.  He exercises with enough effort to increase his heart rate 5 days per week.   He is taking medications regularly.     28 year old male who presents to clinic for follow up.       Concern - Follow up from his visit on 10-19-23 with Dr. Hernandez  Onset: Started in  "August.  Description: Follow up on headaches, neck pain and vitreous floaters of both eyes.  Headaches and vision issues are daily.  Intensity: mild  Progression of Symptoms:  same  Accompanying Signs & Symptoms: Vision issues daily.  When looking at objects things look like static.  Can have a shadow go by with his vision when focused at times.  Can feel tension behind the eyes when it is brighter outside.  Previous history of similar problem: No  Precipitating factors:        Worsened by: Light.  Alleviating factors:        Improved by: Sunglasses help  Therapies tried and outcome: Naproxen for the tension headaches.  That has not helped.      Lots of golfing this summer, sore spots on his shoulder. He was seeing a chiropractor.   Reports got his neck adjusted in August and had a bad migraine. He went back in and got readjusted again and the migraine slightly improved.   Stopped going to the chiropractor since Mid September.     Neck without pain, but has discomfort.   Worse at night.   Reports will get cracking and creaking in the neck with turning.     Headaches used to be more around the head and now more in the frontal region.   Headaches are not real severe. If get distracted does not notice them.   No fevers  No recent illness   No eye watering.     Evaluated by Ophthalmology on 11/3 and stated to have a normal exam and thought more related to chiropractor injury.       Review of Systems   Constitutional, HEENT, cardiovascular, pulmonary, gi and gu systems are negative, except as otherwise noted.      Objective    /80 (BP Location: Right arm, Patient Position: Chair, Cuff Size: Adult Large)   Pulse 70   Temp 98.7  F (37.1  C) (Tympanic)   Resp 18   Ht 1.727 m (5' 8\")   Wt 105.2 kg (232 lb)   SpO2 99%   BMI 35.28 kg/m    Body mass index is 35.28 kg/m .  Physical Exam   General: alert, cooperative, no acute distress   CV: RRR, no murmur  Resp: non-labored breathing, clear to auscultation, no " wheezing or rales   Extremities: No peripheral edema, calves non-tender.   HEENT: NC/AT, PERRL, TM's without signs of infection, nares patent, oropharynx clear and non-erythematous.   Neck: non-tender over the cervical spine. Tender and tense trapezius muscles bilaterally, and paraspinal musculature. ROM full in flexion, extension, side bending, rotation.   Neuro: CN II-XII grossly intact. No focal deficits. Strength and sensation in upper and lower extremities appropriate. Reflexes 2/4. No dysdiadochokinesia.  No issues with finger-nose-finger.

## 2023-11-22 ENCOUNTER — THERAPY VISIT (OUTPATIENT)
Dept: PHYSICAL THERAPY | Facility: CLINIC | Age: 28
End: 2023-11-22
Payer: COMMERCIAL

## 2023-11-22 DIAGNOSIS — G44.209 TENSION HEADACHE: ICD-10-CM

## 2023-11-22 DIAGNOSIS — M54.2 NECK DISCOMFORT: ICD-10-CM

## 2023-11-22 PROCEDURE — 97140 MANUAL THERAPY 1/> REGIONS: CPT | Mod: GP | Performed by: PHYSICAL THERAPIST

## 2023-11-22 PROCEDURE — 97161 PT EVAL LOW COMPLEX 20 MIN: CPT | Mod: GP | Performed by: PHYSICAL THERAPIST

## 2023-11-22 PROCEDURE — 97110 THERAPEUTIC EXERCISES: CPT | Mod: GP | Performed by: PHYSICAL THERAPIST

## 2023-11-22 NOTE — PROGRESS NOTES
PHYSICAL THERAPY EVALUATION  Type of Visit: Evaluation    See electronic medical record for Abuse and Falls Screening details.    Subjective   Patient reports to physical therapy for upper back, neck pain, and headaches that occurred after a chiropractic neck adjustment.  Had migraine that started after that and never went away with chiropractic for 6 weeks.  Pain has been about the same since he stopped going, did have some head flexion and head rolling exercises.  Pain is mostly in bilateral traps and back of the neck.  Headaches are every day, worse at night and in the morning, mild headaches that are right in the center of the forehead.  Did have headaches previously that felt like head was on fire that transitioned to the feeling of tight rubber band, and then cervicogenic deloris's horn headache.  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  Left handed, but uses both.  He does have some light sensitivity and wears sunglasses, vision seems to be grainy at night; followed up with eye doctor and things seem to be fine there.       Presenting condition or subjective complaint: Neck pain trap tension vision issues  Date of onset:      Relevant medical history: Dizziness; Migraines or headaches; Neck injury; Vision problems   Dates & types of surgery:      Prior diagnostic imaging/testing results: X-ray     Prior therapy history for the same diagnosis, illness or injury: No      Prior Level of Function  Transfers: Independent  Ambulation: Independent  ADL: Independent  IADL: Childcare, Driving, Finances, Housekeeping, Laundry, Meal preparation, Medication management, Work    Living Environment  Social support: With a significant other or spouse   Type of home: House   Stairs to enter the home: Yes 3 Is there a railing: Yes   Ramp: No   Stairs inside the home: Yes 25 Is there a railing: Yes   Help at home: None  Equipment owned:       Employment:  Yes  of LTC/TCU  Hobbies/Interests: Golf hunting time with my toddler selina reading    Patient goals for therapy: Sleep relax not stress about my symptoms    Pain assessment: See objective evaluation for additional pain details     Objective   CERVICAL SPINE EVALUATION  PAIN: Pain Level at Rest: 4/10  Pain Level with Use: 5/10  Pain Location: cervical spine and thoracic spine  Pain Quality: Aching  Pain Frequency: intermittent  Pain is Worst: daytime  Pain is Exacerbated By: physical activity, lying in bed   Pain is Relieved By: heat and stretch  Pain Progression: Unchanged  INTEGUMENTARY (edema, incisions): WNL  POSTURE: Standing Posture: Rounded shoulders, Forward head  Sitting Posture: Forward head, Thoracic kyphosis increased  GAIT:   Weightbearing Status:   Assistive Device(s):   Gait Deviations:   BALANCE/PROPRIOCEPTION:   WEIGHTBEARING ALIGNMENT:   ROM:   (Degrees) Left AROM Right AROM    Cervical Flexion 35    Cervical Extension 19    Cervical Side bend 29 ++ tightness in R  29    Cervical Rotation 48 58    Cervical Protrusion     Cervical Retraction     Thoracic Flexion 50%    Thoracic Extension 50%     Thoracic Rotation 75% 90%     Left AROM Left PROM Right AROM Right PROM   Shoulder Flexion       Shoulder Extension       Shoulder Abduction       Shoulder Adduction       Shoulder IR T5  T5    Shoulder ER T3  T3    Shoulder Horiz Abduction       Shoulder Horiz Adduction       Pain:   End Feel:     MYOTOMES: WNL  DTR S:   CORD SIGNS:   DERMATOMES: WNL  NEURAL TENSION:   FLEXIBILITY: Decreased rhomboids L, Decreased upper trap L, Decreased levator L, Decreased rhomboids R, Decreased upper trap R, Decreased levator R   SPECIAL TESTS:    Left Right   Alar Ligament Negative    Cervical Flexion-Rotation Negative  Negative    Cervical Rot/Lateral Flex Negative  Negative    Compression Negative  Negative    Distraction Negative  Negative    Spurling s Negative  Negative    Thoracic Outlet  Screen (Kailee, Adson) Negative  Negative    Transverse Ligament Negative  Negative    Vertebral Artery Negative  Negative    Cotton Roll Test     Craniocervical Flexor Endurance Test     Mannheimer Test            PALPATION:   + Tenderness At Location Left Right   Sternocleidomastoid + +   Scalenes + +   Rhomboids + +   Facet - -   Upper Trap + +   Levator + +   Erector Spinae + +   Suboccipitals + +     SPINAL SEGMENTAL CONCLUSIONS:       Assessment & Plan   CLINICAL IMPRESSIONS  Medical Diagnosis:      Treatment Diagnosis:     Impression/Assessment: Patient is a 28 year old male with neck complaints.  The following significant findings have been identified: Pain, Decreased ROM/flexibility, Decreased joint mobility, Decreased strength, Impaired muscle performance, and Decreased activity tolerance. These impairments interfere with their ability to perform self care tasks, work tasks, and recreational activities as compared to previous level of function.     Clinical Decision Making (Complexity):  Clinical Presentation: Stable/Uncomplicated  Clinical Presentation Rationale: based on medical and personal factors listed in PT evaluation  Clinical Decision Making (Complexity): Low complexity    PLAN OF CARE  Treatment Interventions:  Modalities: Cryotherapy  Interventions: Manual Therapy, Neuromuscular Re-education, Therapeutic Activity, Therapeutic Exercise    Long Term Goals            Frequency of Treatment:    Duration of Treatment:      Recommended Referrals to Other Professionals:  No further referral   Education Assessment:        Risks and benefits of evaluation/treatment have been explained.   Patient/Family/caregiver agrees with Plan of Care.     Evaluation Time:         Present: Not applicable     Signing Clinician: Valentina Watkins, PT

## 2023-11-30 ENCOUNTER — THERAPY VISIT (OUTPATIENT)
Dept: PHYSICAL THERAPY | Facility: CLINIC | Age: 28
End: 2023-11-30
Payer: COMMERCIAL

## 2023-11-30 DIAGNOSIS — G44.209 TENSION HEADACHE: ICD-10-CM

## 2023-11-30 DIAGNOSIS — M54.2 NECK DISCOMFORT: Primary | ICD-10-CM

## 2023-11-30 PROCEDURE — 97112 NEUROMUSCULAR REEDUCATION: CPT | Mod: GP | Performed by: PHYSICAL THERAPIST

## 2023-11-30 PROCEDURE — 97110 THERAPEUTIC EXERCISES: CPT | Mod: GP | Performed by: PHYSICAL THERAPIST

## 2023-11-30 PROCEDURE — 97140 MANUAL THERAPY 1/> REGIONS: CPT | Mod: GP | Performed by: PHYSICAL THERAPIST

## 2023-12-17 ENCOUNTER — MYC MEDICAL ADVICE (OUTPATIENT)
Dept: FAMILY MEDICINE | Facility: CLINIC | Age: 28
End: 2023-12-17
Payer: COMMERCIAL

## 2023-12-17 DIAGNOSIS — R51.9 NONINTRACTABLE HEADACHE, UNSPECIFIED CHRONICITY PATTERN, UNSPECIFIED HEADACHE TYPE: Primary | ICD-10-CM

## 2023-12-19 ENCOUNTER — THERAPY VISIT (OUTPATIENT)
Dept: PHYSICAL THERAPY | Facility: CLINIC | Age: 28
End: 2023-12-19
Payer: COMMERCIAL

## 2023-12-19 DIAGNOSIS — M54.2 NECK DISCOMFORT: Primary | ICD-10-CM

## 2023-12-19 DIAGNOSIS — G44.209 TENSION HEADACHE: ICD-10-CM

## 2023-12-19 PROCEDURE — 97112 NEUROMUSCULAR REEDUCATION: CPT | Mod: GP | Performed by: PHYSICAL THERAPIST

## 2023-12-19 PROCEDURE — 97140 MANUAL THERAPY 1/> REGIONS: CPT | Mod: GP | Performed by: PHYSICAL THERAPIST

## 2023-12-27 ENCOUNTER — OFFICE VISIT (OUTPATIENT)
Dept: FAMILY MEDICINE | Facility: CLINIC | Age: 28
End: 2023-12-27
Payer: COMMERCIAL

## 2023-12-27 VITALS
HEIGHT: 68 IN | OXYGEN SATURATION: 99 % | SYSTOLIC BLOOD PRESSURE: 130 MMHG | HEART RATE: 81 BPM | DIASTOLIC BLOOD PRESSURE: 89 MMHG | WEIGHT: 232 LBS | RESPIRATION RATE: 16 BRPM | TEMPERATURE: 98.5 F | BODY MASS INDEX: 35.16 KG/M2

## 2023-12-27 DIAGNOSIS — G44.209 TENSION HEADACHE: ICD-10-CM

## 2023-12-27 DIAGNOSIS — H53.9 VISION CHANGES: ICD-10-CM

## 2023-12-27 DIAGNOSIS — Z00.00 ROUTINE GENERAL MEDICAL EXAMINATION AT A HEALTH CARE FACILITY: Primary | ICD-10-CM

## 2023-12-27 PROCEDURE — 99395 PREV VISIT EST AGE 18-39: CPT | Performed by: FAMILY MEDICINE

## 2023-12-27 ASSESSMENT — ENCOUNTER SYMPTOMS
FREQUENCY: 0
COUGH: 0
WEAKNESS: 0
SHORTNESS OF BREATH: 0
HEMATURIA: 0
MYALGIAS: 0
HEARTBURN: 0
DYSURIA: 0
PALPITATIONS: 0
ARTHRALGIAS: 0
PARESTHESIAS: 0
SORE THROAT: 0
NERVOUS/ANXIOUS: 0
CONSTIPATION: 0
JOINT SWELLING: 0
ABDOMINAL PAIN: 0
CHILLS: 0
FEVER: 0
DIZZINESS: 0
HEMATOCHEZIA: 0
HEADACHES: 0
EYE PAIN: 0
NAUSEA: 0
DIARRHEA: 0

## 2023-12-27 ASSESSMENT — PAIN SCALES - GENERAL: PAINLEVEL: NO PAIN (0)

## 2023-12-27 NOTE — PATIENT INSTRUCTIONS
Schedule MRI of the brain.     Follow up with Neurology as scheduled.     Continue to work on healthy diet and exercise.       Preventive Health Recommendations  Male Ages 26 - 39    Yearly exam:             See your health care provider every year in order to  o   Review health changes.   o   Discuss preventive care.    o   Review your medicines if your doctor has prescribed any.  You should be tested each year for STDs (sexually transmitted diseases), if you re at risk.   After age 35, talk to your provider about cholesterol testing. If you are at risk for heart disease, have your cholesterol tested at least every 5 years.   If you are at risk for diabetes, you should have a diabetes test (fasting glucose).  Shots: Get a flu shot each year. Get a tetanus shot every 10 years.     Nutrition:  Eat at least 5 servings of fruits and vegetables daily.   Eat whole-grain bread, whole-wheat pasta and brown rice instead of white grains and rice.   Get adequate Calcium and Vitamin D.     Lifestyle  Exercise for at least 150 minutes a week (30 minutes a day, 5 days a week). This will help you control your weight and prevent disease.   Limit alcohol to one drink per day.   No smoking.   Wear sunscreen to prevent skin cancer.   See your dentist every six months for an exam and cleaning.

## 2023-12-27 NOTE — PROGRESS NOTES
Assessment & Plan     Routine general medical examination at a health care facility  Overall doing well. Cholesterol and diabetic screening completed last year and satisfactory.     Tension headache  Vision changes  -- continues to have daily headaches. Less severe than prior. Having vision changes where seeing floaters and stars when outside and looking up. States is having some nystagmus like symptoms at times as well.   -- has seen Ophthalmology on 11/3/2023 and was told had a normal exam.   -- has follow up with Neurology on 2/6/2024  -- due to persistent headaches, vision changes will proceed with MRI for further evaluation.   - MR Brain w/o & w Contrast      The risks, benefits and treatment options of prescribed medications or other treatments have been discussed with the patient. The patient verbalized their understanding and should call or follow up if no improvement or if they develop further problems.    '  Mario Sebastian, [employer] [place] is a 28 year old, presenting for the following health issues:  Physical (Annual)        12/27/2023     2:43 PM   Additional Questions   Roomed by Lucy CARROLL CMA   Accompanied by self         12/27/2023     2:43 PM   Patient Reported Additional Medications   Patient reports taking the following new medications none       Healthy Habits:     Getting at least 3 servings of Calcium per day:  Yes    Bi-annual eye exam:  Yes    Dental care twice a year:  Yes    Sleep apnea or symptoms of sleep apnea:  None    Diet:  Regular (no restrictions)    Frequency of exercise:  None    Taking medications regularly:  Yes    Medication side effects:  Not applicable    Additional concerns today:  No       28 year old male who presents to clinic for follow up annual exam.     Headaches  Scheduled to follow up with Neurology on 2/6/2023  Evaluated by Ophthalmology on 11/3 and noted to have a normal exam.   Continues to have headaches daily. Less  "severe than prior and going to physical therapy.   Vision issues when outside, seeing stars and floaters when looking upward. Usually will not have any issues with looking down.   Alcohol very rare.   No tobacco use.     Review of Systems   Constitutional:  Negative for chills and fever.   HENT:  Negative for congestion, ear pain, hearing loss and sore throat.    Eyes:  Negative for pain and visual disturbance.   Respiratory:  Negative for cough and shortness of breath.    Cardiovascular:  Negative for chest pain, palpitations and peripheral edema.   Gastrointestinal:  Negative for abdominal pain, constipation, diarrhea, heartburn, hematochezia and nausea.   Genitourinary:  Negative for dysuria, frequency, genital sores, hematuria, impotence, penile discharge and urgency.   Musculoskeletal:  Negative for arthralgias, joint swelling and myalgias.   Skin:  Negative for rash.   Neurological:  Negative for dizziness, weakness, headaches and paresthesias.   Psychiatric/Behavioral:  Negative for mood changes. The patient is not nervous/anxious.           Objective    /89 (BP Location: Right arm, Patient Position: Sitting, Cuff Size: Adult Regular)   Pulse 81   Temp 98.5  F (36.9  C) (Oral)   Resp 16   Ht 1.727 m (5' 8\")   Wt 105.2 kg (232 lb)   SpO2 99%   BMI 35.28 kg/m    Body mass index is 35.28 kg/m .  Physical Exam   General: alert, cooperative, no acute distress   HEENT: NC/AT, PERRL, TM's without signs of infection, nares patent, oropharynx clear and non-erythematous.   CV: RRR, no murmur  Resp: non-labored breathing, clear to auscultation, no wheezing or rales   Abdomen: Soft, non-tender, no guarding.   Extremities: No peripheral edema, calves non-tender.   Neuro: CN II-XII grossly intact. No focal deficits. Strength and sensation in upper and lower extremities appropriate. Reflexes 2/4. No dysdiadochokinesia.  No issues with finger-nose-finger.        "

## 2024-01-03 NOTE — PROGRESS NOTES
"History of Present Illness - Gustavo SUZIE Chappell is a very pleasant 28 year old male here to see me for the first time for an oral lesion    He tells me that about 6 months ago he noticed a lump that hurt at first, but at this point it is not symptomatic. It is under the tongue.  No pain, no ulceration no dysphagia, no hemoptysis.    The second topic he wants to talk about is dizziness.  This started about 6-7 months ago.  He things it started after a chiropractic adjustment he immediately had a severe migraine.  Since then he gets dizziness at night. Not when laying down, but when he is just starting to drift off, \"his eyes are bouncing up and down and it feels like the room is spinning.\"    He did get a cervical spine MRI that was normal.  He was sent to PT for canalith maneuvers and they did not help.  He has a Neurology appointment in February.    Past Medical History -   Patient Active Problem List   Diagnosis    Viral warts    Other specified disorder of skin    Constipation    Abdominal pain, generalized    Class 2 obesity due to excess calories without serious comorbidity with body mass index (BMI) of 39.0 to 39.9 in adult    Neck discomfort    Tension headache       Current Medications -   Current Outpatient Medications:     naproxen (NAPROSYN) 500 MG tablet, Take 1 tablet (500 mg) by mouth 2 times daily as needed for headaches (Patient not taking: Reported on 12/27/2023), Disp: 60 tablet, Rfl: 0    tiZANidine (ZANAFLEX) 2 MG tablet, Take 1 tablet (2 mg) by mouth 3 times daily as needed for muscle spasms, Disp: 40 tablet, Rfl: 1    triamcinolone (KENALOG) 0.1 % paste, Take by mouth 2 times daily (Patient not taking: Reported on 10/19/2023), Disp: 5 g, Rfl: 0    Allergies - No Known Allergies    Social History -   Social History     Socioeconomic History    Marital status:    Tobacco Use    Smoking status: Never    Smokeless tobacco: Former     Types: Chew    Tobacco comments:     2-3 tins a month. " Quit in May   Vaping Use    Vaping Use: Never used   Substance and Sexual Activity    Alcohol use: Yes     Comment: During the weekends.    Drug use: No    Sexual activity: Never     Social Determinants of Health     Financial Resource Strain: Low Risk  (12/26/2023)    Financial Resource Strain     Within the past 12 months, have you or your family members you live with been unable to get utilities (heat, electricity) when it was really needed?: No   Food Insecurity: Low Risk  (12/26/2023)    Food Insecurity     Within the past 12 months, did you worry that your food would run out before you got money to buy more?: No     Within the past 12 months, did the food you bought just not last and you didn t have money to get more?: No   Transportation Needs: Low Risk  (12/26/2023)    Transportation Needs     Within the past 12 months, has lack of transportation kept you from medical appointments, getting your medicines, non-medical meetings or appointments, work, or from getting things that you need?: No   Interpersonal Safety: Low Risk  (12/27/2023)    Interpersonal Safety     Do you feel physically and emotionally safe where you currently live?: Yes     Within the past 12 months, have you been hit, slapped, kicked or otherwise physically hurt by someone?: No     Within the past 12 months, have you been humiliated or emotionally abused in other ways by your partner or ex-partner?: No   Housing Stability: Low Risk  (12/26/2023)    Housing Stability     Do you have housing? : Yes     Are you worried about losing your housing?: No       Family History -   Family History   Problem Relation Age of Onset    Diabetes Father     Hypertension Father     Lipids Father     Cancer Father         Throat cancer    Cancer Paternal Grandmother         Lung cancer    Cerebrovascular Disease Paternal Grandfather     Neurologic Disorder Paternal Grandfather         parkinson's    Hypertension Maternal Grandmother     Hypertension Maternal  Grandfather        Review of Systems - As per HPI and PMHx, otherwise 10+ system review of the head and neck, and general constitution is negative.    Physical Exam  /87   Pulse 83   Wt 107.1 kg (236 lb 3.2 oz)   SpO2 94%   BMI 35.91 kg/m        General - The patient is well nourished and well developed, and appears to have good nutritional status.  Alert and oriented to person and place, answers questions and cooperates with examination appropriately.   Head and Face - Normocephalic and atraumatic, with no gross asymmetry noted of the contour of the facial features.  The facial nerve is intact, with strong symmetric movements.  Voice and Breathing - The patient was breathing comfortably without the use of accessory muscles. There was no wheezing, stridor, or stertor.  The patients voice was clear and strong, and had appropriate pitch and quality.  Ears - The tympanic membranes are normal in appearance, bony landmarks are intact.  No retraction, perforation, or masses.  No fluid or purulence was seen in the external canal or the middle ear. No evidence of infection of the middle ear or external canal, cerumen was normal in appearance.  Eyes - Extraocular movements intact, and the pupils were reactive to light.  Sclera were not icteric or injected, conjunctiva were pink and moist.  Mouth - Examination of the oral cavity showed pink, healthy oral mucosa. No lesions or ulcerations noted.  The tongue was mobile and midline, and the dentition were in good condition.  The lesion in question was a small spherical cyst to the RIGHT of the midline on the anterior floor of mouth. It is soft and there is a clear yellow punctum  Throat - The walls of the oropharynx were smooth, pink, moist, symmetric, and had no lesions or ulcerations.  The tonsillar pillars and soft palate were symmetric.  The uvula was midline on elevation.    Neck - Normal midline excursion of the laryngotracheal complex during swallowing.  Full  range of motion on passive movement.  Palpation of the occipital, submental, submandibular, internal jugular chain, and supraclavicular nodes did not demonstrate any abnormal lymph nodes or masses.  The carotid pulse was palpable bilaterally.  Palpation of the thyroid was soft and smooth, with no nodules or goiter appreciated.  The trachea was mobile and midline.  Nose - External contour is symmetric, no gross deflection or scars.  Nasal mucosa is pink and moist with no abnormal mucus.  The septum was midline and non-obstructive, turbinates of normal size and position.  No polyps, masses, or purulence noted on examination.    Audiologic Studies - An audiogram and tympanogram were performed on 1/9/2024 as part of the evaluation and personally reviewed. The tympanogram shows a normal Type A curve, with normal canal volume and middle ear pressure.  There is no sign of eustachian tube dysfunction or middle ear effusion.  The audiogram was also normal.  The sensorineural hearing was age-appropriate, with no evidence of conductive hearing loss or significant asymmetry.      A/P - Gustavo Chappell is a 28 year old male  (R42) Dizziness  (primary encounter diagnosis)  (K13.70) Lesion of oral mucosa  (Z87.891) Personal history of tobacco use, presenting hazards to health    The oral lesion is a mucocele.  I have counseled him that if it gets bigger, he can contact us and we can set up a 30 minute procedure visit in clinic under local.    With regards to the balance issue, this does not sound vestibular.  I suspect a possible Somatosensory issue, but the other symptoms of visual changes and headache suggest the possibility of vestibular or atypical migraine as well.  I am glad he is going to a Neurologist.  I will pre empitvely order Balance Testing

## 2024-01-09 ENCOUNTER — OFFICE VISIT (OUTPATIENT)
Dept: AUDIOLOGY | Facility: CLINIC | Age: 29
End: 2024-01-09
Payer: COMMERCIAL

## 2024-01-09 ENCOUNTER — THERAPY VISIT (OUTPATIENT)
Dept: PHYSICAL THERAPY | Facility: CLINIC | Age: 29
End: 2024-01-09
Payer: COMMERCIAL

## 2024-01-09 DIAGNOSIS — R42 DIZZINESS AND GIDDINESS: Primary | ICD-10-CM

## 2024-01-09 DIAGNOSIS — G44.209 TENSION HEADACHE: ICD-10-CM

## 2024-01-09 DIAGNOSIS — M54.2 NECK DISCOMFORT: Primary | ICD-10-CM

## 2024-01-09 PROCEDURE — 97140 MANUAL THERAPY 1/> REGIONS: CPT | Mod: GP | Performed by: PHYSICAL THERAPIST

## 2024-01-09 PROCEDURE — 97112 NEUROMUSCULAR REEDUCATION: CPT | Mod: GP | Performed by: PHYSICAL THERAPIST

## 2024-01-09 PROCEDURE — 92550 TYMPANOMETRY & REFLEX THRESH: CPT

## 2024-01-09 PROCEDURE — 92557 COMPREHENSIVE HEARING TEST: CPT

## 2024-01-09 NOTE — PROGRESS NOTES
AUDIOLOGY REPORT:    Patient was referred to [facility] from ENT by Dr. Link for a hearing examination. Patient is here today with concerns regarding room-spinning dizziness that began six months ago following a chiropractic adjustment. Santi denies any pain, pressure, drainage, history of ear surgeries and family history of hearing loss. He does report longstanding, constant tinnitus along with a history of noise exposure through various occupations and hunting (left-handed shooter). The patient was not accompanied to today's appointment     Testing:    Otoscopy:   Otoscopic exam indicates ears are clear of cerumen bilaterally     Tympanograms:    RIGHT: normal eardrum mobility     LEFT:   normal eardrum mobility    Reflexes (reported by stimulus ear): 1000 Hz  RIGHT: Ipsilateral is present at normal levels  RIGHT: Contralateral is present at normal levels  LEFT:   Ipsilateral is present at normal levels  LEFT:   Contralateral is present at normal levels    Thresholds:   Pure Tone Thresholds assessed using conventional audiometry with good  reliability from 250-8000 Hz bilaterally using circumaural headphones     RIGHT:  normal hearing sensitivity at frequencies tested    LEFT:    normal hearing sensitivity at frequencies tested    Speech Reception Threshold:    RIGHT: 5 dB HL    LEFT:   5 dB HL  Speech Reception Thresholds are in good agreement with pure tone thresholds    Word Recognition Score:     RIGHT: 100% at 50 dB HL using NU-6 recorded word list.    LEFT:   100% at 50 dB HL using NU-6 recorded word list.    Discussed results with the patient.     Patient is scheduled to follow-up with ENT on 1/11/2024    Florentin Auguste, CCC-A  Licensed Audiologist  [place] #[id number]    01/09/24

## 2024-01-10 ENCOUNTER — ANCILLARY PROCEDURE (OUTPATIENT)
Dept: MRI IMAGING | Facility: CLINIC | Age: 29
End: 2024-01-10
Attending: FAMILY MEDICINE
Payer: COMMERCIAL

## 2024-01-10 DIAGNOSIS — G44.209 TENSION HEADACHE: ICD-10-CM

## 2024-01-10 DIAGNOSIS — H53.9 VISION CHANGES: ICD-10-CM

## 2024-01-10 PROCEDURE — 70553 MRI BRAIN STEM W/O & W/DYE: CPT | Mod: TC | Performed by: RADIOLOGY

## 2024-01-10 PROCEDURE — A9585 GADOBUTROL INJECTION: HCPCS | Performed by: RADIOLOGY

## 2024-01-10 RX ORDER — GADOBUTROL 604.72 MG/ML
0.1 INJECTION INTRAVENOUS ONCE
Status: COMPLETED | OUTPATIENT
Start: 2024-01-10 | End: 2024-01-10

## 2024-01-10 RX ADMIN — GADOBUTROL 10 ML: 604.72 INJECTION INTRAVENOUS at 15:17

## 2024-01-11 ENCOUNTER — OFFICE VISIT (OUTPATIENT)
Dept: OTOLARYNGOLOGY | Facility: CLINIC | Age: 29
End: 2024-01-11
Attending: NURSE PRACTITIONER
Payer: COMMERCIAL

## 2024-01-11 VITALS
WEIGHT: 236.2 LBS | BODY MASS INDEX: 35.91 KG/M2 | OXYGEN SATURATION: 94 % | DIASTOLIC BLOOD PRESSURE: 87 MMHG | HEART RATE: 83 BPM | SYSTOLIC BLOOD PRESSURE: 129 MMHG

## 2024-01-11 DIAGNOSIS — K13.70 LESION OF ORAL MUCOSA: ICD-10-CM

## 2024-01-11 DIAGNOSIS — R42 DIZZINESS: Primary | ICD-10-CM

## 2024-01-11 DIAGNOSIS — Z87.891 PERSONAL HISTORY OF TOBACCO USE, PRESENTING HAZARDS TO HEALTH: ICD-10-CM

## 2024-01-11 PROCEDURE — 99204 OFFICE O/P NEW MOD 45 MIN: CPT | Performed by: OTOLARYNGOLOGY

## 2024-01-11 NOTE — LETTER
"    1/11/2024         RE: Gustavo Chappell  [address] [place] [id number]        Dear Colleague,    Thank you for referring your patient, Gustavo Chappell, to the [facility]. Please see a copy of my visit note below.    History of Present Illness - Gustavo Chappell is a very pleasant 28 year old male here to see me for the first time for an oral lesion    He tells me that about 6 months ago he noticed a lump that hurt at first, but at this point it is not symptomatic. It is under the tongue.  No pain, no ulceration no dysphagia, no hemoptysis.    The second topic he wants to talk about is dizziness.  This started about 6-7 months ago.  He things it started after a chiropractic adjustment he immediately had a severe migraine.  Since then he gets dizziness at night. Not when laying down, but when he is just starting to drift off, \"his eyes are bouncing up and down and it feels like the room is spinning.\"    He did get a cervical spine MRI that was normal.  He was sent to PT for canalith maneuvers and they did not help.  He has a Neurology appointment in February.    Past Medical History -   Patient Active Problem List   Diagnosis     Viral warts     Other specified disorder of skin     Constipation     Abdominal pain, generalized     Class 2 obesity due to excess calories without serious comorbidity with body mass index (BMI) of 39.0 to 39.9 in adult     Neck discomfort     Tension headache       Current Medications -   Current Outpatient Medications:      naproxen (NAPROSYN) 500 MG tablet, Take 1 tablet (500 mg) by mouth 2 times daily as needed for headaches (Patient not taking: Reported on 12/27/2023), Disp: 60 tablet, Rfl: 0     tiZANidine (ZANAFLEX) 2 MG tablet, Take 1 tablet (2 mg) by mouth 3 times daily as needed for muscle spasms, Disp: 40 tablet, Rfl: 1     triamcinolone (KENALOG) 0.1 % paste, Take by mouth 2 times daily (Patient not taking: Reported on 10/19/2023), Disp: 5 " g, Rfl: 0    Allergies - No Known Allergies    Social History -   Social History     Socioeconomic History     Marital status:    Tobacco Use     Smoking status: Never     Smokeless tobacco: Former     Types: Chew     Tobacco comments:     2-3 tins a month. Quit in May   Vaping Use     Vaping Use: Never used   Substance and Sexual Activity     Alcohol use: Yes     Comment: During the weekends.     Drug use: No     Sexual activity: Never     Social Determinants of Health     Financial Resource Strain: Low Risk  (12/26/2023)    Financial Resource Strain      Within the past 12 months, have you or your family members you live with been unable to get utilities (heat, electricity) when it was really needed?: No   Food Insecurity: Low Risk  (12/26/2023)    Food Insecurity      Within the past 12 months, did you worry that your food would run out before you got money to buy more?: No      Within the past 12 months, did the food you bought just not last and you didn t have money to get more?: No   Transportation Needs: Low Risk  (12/26/2023)    Transportation Needs      Within the past 12 months, has lack of transportation kept you from medical appointments, getting your medicines, non-medical meetings or appointments, work, or from getting things that you need?: No   Interpersonal Safety: Low Risk  (12/27/2023)    Interpersonal Safety      Do you feel physically and emotionally safe where you currently live?: Yes      Within the past 12 months, have you been hit, slapped, kicked or otherwise physically hurt by someone?: No      Within the past 12 months, have you been humiliated or emotionally abused in other ways by your partner or ex-partner?: No   Housing Stability: Low Risk  (12/26/2023)    Housing Stability      Do you have housing? : Yes      Are you worried about losing your housing?: No       Family History -   Family History   Problem Relation Age of Onset     Diabetes Father      Hypertension Father       Lipids Father      Cancer Father         Throat cancer     Cancer Paternal Grandmother         Lung cancer     Cerebrovascular Disease Paternal Grandfather      Neurologic Disorder Paternal Grandfather         parkinson's     Hypertension Maternal Grandmother      Hypertension Maternal Grandfather        Review of Systems - As per HPI and PMHx, otherwise 10+ system review of the head and neck, and general constitution is negative.    Physical Exam  /87   Pulse 83   Wt 107.1 kg (236 lb 3.2 oz)   SpO2 94%   BMI 35.91 kg/m        General - The patient is well nourished and well developed, and appears to have good nutritional status.  Alert and oriented to person and place, answers questions and cooperates with examination appropriately.   Head and Face - Normocephalic and atraumatic, with no gross asymmetry noted of the contour of the facial features.  The facial nerve is intact, with strong symmetric movements.  Voice and Breathing - The patient was breathing comfortably without the use of accessory muscles. There was no wheezing, stridor, or stertor.  The patients voice was clear and strong, and had appropriate pitch and quality.  Ears - The tympanic membranes are normal in appearance, bony landmarks are intact.  No retraction, perforation, or masses.  No fluid or purulence was seen in the external canal or the middle ear. No evidence of infection of the middle ear or external canal, cerumen was normal in appearance.  Eyes - Extraocular movements intact, and the pupils were reactive to light.  Sclera were not icteric or injected, conjunctiva were pink and moist.  Mouth - Examination of the oral cavity showed pink, healthy oral mucosa. No lesions or ulcerations noted.  The tongue was mobile and midline, and the dentition were in good condition.  The lesion in question was a small spherical cyst to the RIGHT of the midline on the anterior floor of mouth. It is soft and there is a clear yellow punctum  Throat  - The walls of the oropharynx were smooth, pink, moist, symmetric, and had no lesions or ulcerations.  The tonsillar pillars and soft palate were symmetric.  The uvula was midline on elevation.    Neck - Normal midline excursion of the laryngotracheal complex during swallowing.  Full range of motion on passive movement.  Palpation of the occipital, submental, submandibular, internal jugular chain, and supraclavicular nodes did not demonstrate any abnormal lymph nodes or masses.  The carotid pulse was palpable bilaterally.  Palpation of the thyroid was soft and smooth, with no nodules or goiter appreciated.  The trachea was mobile and midline.  Nose - External contour is symmetric, no gross deflection or scars.  Nasal mucosa is pink and moist with no abnormal mucus.  The septum was midline and non-obstructive, turbinates of normal size and position.  No polyps, masses, or purulence noted on examination.    Audiologic Studies - An audiogram and tympanogram were performed on 1/9/2024 as part of the evaluation and personally reviewed. The tympanogram shows a normal Type A curve, with normal canal volume and middle ear pressure.  There is no sign of eustachian tube dysfunction or middle ear effusion.  The audiogram was also normal.  The sensorineural hearing was age-appropriate, with no evidence of conductive hearing loss or significant asymmetry.      A/P - Gustavo Chappell is a 28 year old male  (R42) Dizziness  (primary encounter diagnosis)  (K13.70) Lesion of oral mucosa  (Z87.891) Personal history of tobacco use, presenting hazards to health    The oral lesion is a mucocele.  I have counseled him that if it gets bigger, he can contact us and we can set up a 30 minute procedure visit in clinic under local.    With regards to the balance issue, this does not sound vestibular.  I suspect a possible Somatosensory issue, but the other symptoms of visual changes and headache suggest the possibility of vestibular or  atypical migraine as well.  I am glad he is going to a Neurologist.  I will pre empitvely order Balance Testing      Again, thank you for allowing me to participate in the care of your patient.        Sincerely,        Jemal Link MD

## 2024-01-23 ENCOUNTER — THERAPY VISIT (OUTPATIENT)
Dept: PHYSICAL THERAPY | Facility: CLINIC | Age: 29
End: 2024-01-23
Payer: COMMERCIAL

## 2024-01-23 DIAGNOSIS — M54.2 NECK DISCOMFORT: Primary | ICD-10-CM

## 2024-01-23 DIAGNOSIS — G44.209 TENSION HEADACHE: ICD-10-CM

## 2024-01-23 PROCEDURE — 97140 MANUAL THERAPY 1/> REGIONS: CPT | Mod: GP | Performed by: PHYSICAL THERAPIST

## 2024-01-23 PROCEDURE — 97112 NEUROMUSCULAR REEDUCATION: CPT | Mod: GP | Performed by: PHYSICAL THERAPIST

## 2024-02-02 ENCOUNTER — THERAPY VISIT (OUTPATIENT)
Dept: PHYSICAL THERAPY | Facility: CLINIC | Age: 29
End: 2024-02-02
Payer: COMMERCIAL

## 2024-02-02 DIAGNOSIS — M54.2 NECK DISCOMFORT: Primary | ICD-10-CM

## 2024-02-02 DIAGNOSIS — G44.209 TENSION HEADACHE: ICD-10-CM

## 2024-02-02 PROCEDURE — 97110 THERAPEUTIC EXERCISES: CPT | Mod: GP | Performed by: PHYSICAL THERAPIST

## 2024-02-02 PROCEDURE — 97112 NEUROMUSCULAR REEDUCATION: CPT | Mod: GP | Performed by: PHYSICAL THERAPIST

## 2024-02-02 PROCEDURE — 97140 MANUAL THERAPY 1/> REGIONS: CPT | Mod: GP | Performed by: PHYSICAL THERAPIST

## 2024-02-05 ASSESSMENT — MIGRAINE DISABILITY ASSESSMENT (MIDAS)
TOTAL SCORE: 0
HOW MANY DAYS WAS HOUSEWORK PRODUCTIVITY CUT IN HALF DUE TO HEADACHES: 0
HOW MANY DAYS WAS YOUR PRODUCTIVITY CUT IN HALF BECAUSE OF HEADACHES: 0
HOW MANY DAYS IN THE PAST 3 MONTHS HAVE YOU HAD A HEADACHE: 60
HOW MANY DAYS DID YOU NOT DO HOUSEWORK BECAUSE OF HEADACHES: 0
HOW MANY DAYS DID YOU MISS WORK OR SCHOOL BECAUSE OF HEADACHES: 0
ON A SCALE FROM 0-10 ON AVERAGE HOW PAINFUL WERE HEADACHES: 5
HOW OFTEN WERE SOCIAL ACTIVITIES MISSED DUE TO HEADACHES: 0

## 2024-02-05 ASSESSMENT — HEADACHE IMPACT TEST (HIT 6)
WHEN YOU HAVE HEADACHES HOW OFTEN IS THE PAIN SEVERE: SOMETIMES
HIT6 TOTAL SCORE: 55
HOW OFTEN DID HEADACHS LIMIT CONCENTRATION ON WORK OR DAILY ACTIVITY: SOMETIMES
HOW OFTEN HAVE YOU FELT TOO TIRED TO WORK BECAUSE OF YOUR HEADACHES: VERY OFTEN
HOW OFTEN HAVE YOU FELT FED UP OR IRRITATED BECAUSE OF YOUR HEADACHES: SOMETIMES
WHEN YOU HAVE A HEADACHE HOW OFTEN DO YOU WISH YOU COULD LIE DOWN: NEVER
HOW OFTEN DO HEADACHES LIMIT YOUR DAILY ACTIVITIES: RARELY

## 2024-02-06 ENCOUNTER — OFFICE VISIT (OUTPATIENT)
Dept: NEUROLOGY | Facility: CLINIC | Age: 29
End: 2024-02-06
Attending: FAMILY MEDICINE
Payer: COMMERCIAL

## 2024-02-06 VITALS
DIASTOLIC BLOOD PRESSURE: 79 MMHG | WEIGHT: 236 LBS | HEIGHT: 68 IN | OXYGEN SATURATION: 99 % | SYSTOLIC BLOOD PRESSURE: 128 MMHG | HEART RATE: 65 BPM | BODY MASS INDEX: 35.77 KG/M2

## 2024-02-06 DIAGNOSIS — R51.9 NONINTRACTABLE HEADACHE, UNSPECIFIED CHRONICITY PATTERN, UNSPECIFIED HEADACHE TYPE: ICD-10-CM

## 2024-02-06 PROCEDURE — 99204 OFFICE O/P NEW MOD 45 MIN: CPT | Performed by: PSYCHIATRY & NEUROLOGY

## 2024-02-06 NOTE — NURSING NOTE
"Gustavo Chappell is a 28 year old male who presents for:  Chief Complaint   Patient presents with    Headache     Consultation: Nonintractable headache, unspecified chronicity pattern, unspecified headache type, Ref by JAMILA ARCINIEGA  Duration: daily at first, now its been every 2-3 days with mild severity compared to before. Symptoms: bilateral visual disturbances. Sensitivities light, Medications: Naproxen no help.         Initial Vitals:  /79   Pulse 65   Ht 1.727 m (5' 8\")   Wt 107 kg (236 lb)   SpO2 99%   BMI 35.88 kg/m   Estimated body mass index is 35.88 kg/m  as calculated from the following:    Height as of this encounter: 1.727 m (5' 8\").    Weight as of this encounter: 107 kg (236 lb).. Body surface area is 2.27 meters squared. BP completed using cuff size: zahida Benavidez CMA    "

## 2024-02-06 NOTE — LETTER
2/6/2024         RE: Gustavo Chappell  [address] [place] [id number]        Dear Colleague,    Thank you for referring your patient, Gustavo Chappell, to the [facility]. Please see a copy of my visit note below.    [facility]   Headache Neurology Consult  February 6, 2024     Gustavo Chappell MRN# [id number]   YOB: 1995 Age: 28 year old     Requesting provider:   Jamila Sebastian, DO              Assessment and Recommendations:     Gustavo Chappell is a 28 year old male who presents for evaluation of migrainous headaches, dizziness accompanied by what sounds like subjective vertical nystagmus while lying down, and visual snow symptoms starting subacutely after chiropractic manipulation.    He has had improvement in both the headaches as well as the dizziness with cervical neck PT, but not visual snow static or floaters. He has previously seen ophthalmology in October 2023 after symptom onset and no underlying cause was found. His brain MRI was unrevealing for a cause to my eye. We will obtain a brain MR angiogram and neck MR angiogram to evaluate for an arterial dissection or aneurysm.    Discussed that treatment for visual snow is lamotrigine 25mg titrated to 200mg daily, but there is no robust evidence for treatment of this condition. If it is bothersome, can trial it.    We will meet in return visit to review his MRA findings.     Total time spent today was 57 minutes in chart review, history, exam and counseling.      Reshma Wheeler MD  Neurology            Chief Complaint:     Chief Complaint   Patient presents with     Headache     Consultation: Nonintractable headache, unspecified chronicity pattern, unspecified headache type, Ref by JAMILA SEBASTIAN  Duration: daily at first, now its been every 2-3 days with mild severity compared to before. Symptoms: bilateral visual disturbances. Sensitivities light,  Medications: Naproxen no help.            History is obtained from the patient and medical record.      Gustavo Chappell is a 28 year old male who is mostly concerned with vision problems. Does endorse headaches and dizziness. He thinks that these are associated.     He was into golfing this summer and noted he had an issue in the ribs and then adjusted his neck as part of the evaluation.     He had his first headaches after seeing a chiropractor in August 2023 and after a cervical adjustment (unsure if high velocity manipulation) he would not see well. Photophobia, visual snow static and dizziness (vertigo which laying down and the sensation of vertical nystagmus) after that.    Headaches started and moved all around in different spots occipitally and after the second adjustment, was like a crown and then after cervical neck PT has been restricted to the bifrontal (central )region.     The initial headache was accompanied by blue flashing on a carpet and the walls and any open lighter color and the severity and the photophobia (still severe). There is no phonophobia. Nausea has accompanied the pain when lying down in bed, but not severely. No vomiting. Headache built up over time and was not thunderclap, this was over the course of days. Some times the headaches felt like warm rushing water (moved from side to side). There was a throbbing and it was worsened by activity.     The last week his headaches were mild. Prior to that daily and would last 4 hours most of the day. Of these, 5 were severe. Average intensity was a 2-3/10 and the worst 8/10.   As he started PT, pain has improved, but the visual issues have continued.     He will see glitter or tiny little flashes and floaty hair like transparent worm like structures. They overlap and range. It is constantly moving but can see through them. It will take more effort to focus. 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     No blurred, diplopia or blackout of vision.   There has been tinnitus and predates the chiropractor.     When he does stretches for PT this will induce headaches. Headaches will come on in certain positions in bed. Other times they are random. The last few weeks are better in terms of dizziness. When he lays down, he will induce it too. Head position does not induce the dizziness as far as he is aware and it will continue until he falls asleeps and this is in terms of about 45 minutes- 1 hour. The vertical eye bouncing starts and he has difficulty falling asleep.     No fevers, night sweats, loss of appetite, unexplained weight loss.             Past Medical History:     Past Medical History:   Diagnosis Date     Allergic urticaria 2004              Past Surgical History:     Past Surgical History:   Procedure Laterality Date     wisdom teeth      age 18             Social History:     Social History     Socioeconomic History     Marital status:      Spouse name: Not on file     Number of children: Not on file     Years of education: Not on file     Highest education level: Not on file   Occupational History     Not on file   Tobacco Use     Smoking status: Never     Smokeless tobacco: Former     Types: Chew     Tobacco comments:     2-3 tins a month. Quit in May 2023   Vaping Use     Vaping Use: Never used   Substance and Sexual Activity     Alcohol use: Yes     Comment: During the weekends occasionally, about 2 drinks     Drug use: No     Sexual activity: Never   Other Topics Concern     Not on file   Social History Narrative     Not on file     Social Determinants of Health     Financial Resource Strain: Low Risk  (12/26/2023)    Financial Resource Strain      Within the past 12 months, have you or your family members you live with been unable to get utilities (heat, electricity) when it was really needed?: No   Food Insecurity: Low Risk  (12/26/2023)    Food Insecurity      Within the past 12  months, did you worry that your food would run out before you got money to buy more?: No      Within the past 12 months, did the food you bought just not last and you didn t have money to get more?: No   Transportation Needs: Low Risk  (12/26/2023)    Transportation Needs      Within the past 12 months, has lack of transportation kept you from medical appointments, getting your medicines, non-medical meetings or appointments, work, or from getting things that you need?: No   Physical Activity: Not on file   Stress: Not on file   Social Connections: Not on file   Interpersonal Safety: Low Risk  (12/27/2023)    Interpersonal Safety      Do you feel physically and emotionally safe where you currently live?: Yes      Within the past 12 months, have you been hit, slapped, kicked or otherwise physically hurt by someone?: No      Within the past 12 months, have you been humiliated or emotionally abused in other ways by your partner or ex-partner?: No   Housing Stability: Low Risk  (12/26/2023)    Housing Stability      Do you have housing? : Yes      Are you worried about losing your housing?: No             Family History:     Family History   Problem Relation Age of Onset     Diabetes Father      Hypertension Father      Lipids Father      Cancer Father         Throat cancer     Cancer Paternal Grandmother         Lung cancer     Cerebrovascular Disease Paternal Grandfather      Neurologic Disorder Paternal Grandfather         parkinson's     Hypertension Maternal Grandmother      Hypertension Maternal Grandfather    None for headache or migraine          Allergies:    No Known Allergies          Medications:   Acute headache medications:  Tylenol and ibuprofen did not touch headaches  Exedrine migraine did not help headaches    Preventative headache medications:  None     Current Outpatient Medications:      tiZANidine (ZANAFLEX) 2 MG tablet, Take 1 tablet (2 mg) by mouth 3 times daily as needed for muscle spasms, Disp:  "40 tablet, Rfl: 1 -rarely takes this   Multivitamin           Physical Exam:   /79   Pulse 65   Ht 1.727 m (5' 8\")   Wt 107 kg (236 lb)   SpO2 99%   BMI 35.88 kg/m     Physical Exam:   Neurologic:   Mental Status Exam: Alert, awake and oriented to situation. No dysarthria. Speech of normal fluency.   Cranial Nerves: Fundoscopic exam with clear disc margins bilaterally. PERRLA, EOMs intact, no nystagmus, facial movements symmetric, facial sensation intact to light touch, hearing intact to conversation, trapezius and SCMs 5/5 bilaterally, tongue midline and fully mobile. No tongue atrophy.    Motor: Normal tone in all four extremities, no atrophy. 5/5 strength bilaterally in shoulder abduction, elbow extensors and flexors, wrist extensors and flexors, hip flexors, knee extensors and flexors, dorsi- and plantarflexion. No tremors or abnormal movements noted.   Sensory: Sensation intact to pinprick and vibration sensation on arms and legs bilaterally.    Coordination: Finger-nose-finger intact bilaterally.  Rapidly alternating movements intact bilaterally in the upper extremities.  Normal finger tapping bilaterally.  Intact heel-shin bilaterally. Normal Romberg.   Reflexes: 2+ and symmetric in triceps, biceps, brachioradialis, patellar, Achilles, and plantars downgoing bilaterally.   Gait: Normal gait.  Able to toe and heel walk.  Tandem gait normal.  Head: Normocephalic, atraumatic. No radiating pain with palpation over the supraorbital notches, occipital nerves.    Neck: Normal range of motion with lateral head movements and neck flexion.  Eyes: No conjunctival injection, no scleral icterus.           Data:     Narrative & Impression   MRI OF THE BRAIN WITHOUT AND WITH CONTRAST 1/10/2024 3:33 PM      COMPARISON: None.     HISTORY:  New headaches. Tension headache. Vision changes.      TECHNIQUE: Multi-sequence, multi-planar MRI images of the brain were  acquired before and after the administration of IV " gadolinium (10 mL  Gadavist).     FINDINGS: The ventricles and basal cisterns are normal in  configuration. There is no midline shift. There are no extra-axial  fluid collections. Gray-white differentiation is well maintained.  There is no evidence for stroke or acute intracranial hemorrhage.  There is no abnormal contrast enhancement in the brain or its  coverings.     There is no sinusitis or mastoiditis.                                                                      IMPRESSION: Normal brain MRI.        JESSICA ABRAMS MD         Again, thank you for allowing me to participate in the care of your patient.        Sincerely,        Reshma Wheeler MD

## 2024-02-06 NOTE — PROGRESS NOTES
[facility]   Headache Neurology Consult  February 6, 2024     Gustavo Chappell MRN# [id number]   YOB: 1995 Age: 28 year old     Requesting provider:   Jamila Sebastian DO              Assessment and Recommendations:     Gustavo Chappell is a 28 year old male who presents for evaluation of migrainous headaches, dizziness accompanied by what sounds like subjective vertical nystagmus while lying down, and visual snow symptoms starting subacutely after chiropractic manipulation.    He has had improvement in both the headaches as well as the dizziness with cervical neck PT, but not visual snow static or floaters. He has previously seen ophthalmology in October 2023 after symptom onset and no underlying cause was found. His brain MRI was unrevealing for a cause to my eye. We will obtain a brain MR angiogram and neck MR angiogram to evaluate for an arterial dissection or aneurysm.    Discussed that treatment for visual snow is lamotrigine 25mg titrated to 200mg daily, but there is no robust evidence for treatment of this condition. If it is bothersome, can trial it.    We will meet in return visit to review his MRA findings.     Total time spent today was 57 minutes in chart review, history, exam and counseling.      Reshma Wheeler MD  Neurology            Chief Complaint:     Chief Complaint   Patient presents with    Headache     Consultation: Nonintractable headache, unspecified chronicity pattern, unspecified headache type, Ref by JAMILA SEBASTIAN  Duration: daily at first, now its been every 2-3 days with mild severity compared to before. Symptoms: bilateral visual disturbances. Sensitivities light, Medications: Naproxen no help.            History is obtained from the patient and medical record.      Gustavo Chappell is a 28 year old male who is mostly concerned with vision problems. Does endorse headaches and dizziness. He thinks that these are associated.     He  was into golfing this summer and noted he had an issue in the ribs and then adjusted his neck as part of the evaluation.     He had his first headaches after seeing a chiropractor in August 2023 and after a cervical adjustment (unsure if high velocity manipulation) he would not see well. Photophobia, visual snow static and dizziness (vertigo which laying down and the sensation of vertical nystagmus) after that.    Headaches started and moved all around in different spots occipitally and after the second adjustment, was like a crown and then after cervical neck PT has been restricted to the bifrontal (central )region.     The initial headache was accompanied by blue flashing on a carpet and the walls and any open lighter color and the severity and the photophobia (still severe). There is no phonophobia. Nausea has accompanied the pain when lying down in bed, but not severely. No vomiting. Headache built up over time and was not thunderclap, this was over the course of days. Some times the headaches felt like warm rushing water (moved from side to side). There was a throbbing and it was worsened by activity.     The last week his headaches were mild. Prior to that daily and would last 4 hours most of the day. Of these, 5 were severe. Average intensity was a 2-3/10 and the worst 8/10.   As he started PT, pain has improved, but the visual issues have continued.     He will see glitter or tiny little flashes and floaty hair like transparent worm like structures. They overlap and range. It is constantly moving but can see through them. It will take more effort to focus. 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     No blurred, diplopia or blackout of vision.   There has been tinnitus and predates the chiropractor.     When he does stretches for PT this will induce headaches. Headaches will come on in certain positions in bed. Other times they are random. The last few weeks  are better in terms of dizziness. When he lays down, he will induce it too. Head position does not induce the dizziness as far as he is aware and it will continue until he falls asleeps and this is in terms of about 45 minutes- 1 hour. The vertical eye bouncing starts and he has difficulty falling asleep.     No fevers, night sweats, loss of appetite, unexplained weight loss.             Past Medical History:     Past Medical History:   Diagnosis Date    Allergic urticaria 2004              Past Surgical History:     Past Surgical History:   Procedure Laterality Date    wisdom teeth      age 18             Social History:     Social History     Socioeconomic History    Marital status:      Spouse name: Not on file    Number of children: Not on file    Years of education: Not on file    Highest education level: Not on file   Occupational History    Not on file   Tobacco Use    Smoking status: Never    Smokeless tobacco: Former     Types: Chew    Tobacco comments:     2-3 tins a month. Quit in May 2023   Vaping Use    Vaping Use: Never used   Substance and Sexual Activity    Alcohol use: Yes     Comment: During the weekends occasionally, about 2 drinks    Drug use: No    Sexual activity: Never   Other Topics Concern    Not on file   Social History Narrative    Not on file     Social Determinants of Health     Financial Resource Strain: Low Risk  (12/26/2023)    Financial Resource Strain     Within the past 12 months, have you or your family members you live with been unable to get utilities (heat, electricity) when it was really needed?: No   Food Insecurity: Low Risk  (12/26/2023)    Food Insecurity     Within the past 12 months, did you worry that your food would run out before you got money to buy more?: No     Within the past 12 months, did the food you bought just not last and you didn t have money to get more?: No   Transportation Needs: Low Risk  (12/26/2023)    Transportation Needs     Within the past  "12 months, has lack of transportation kept you from medical appointments, getting your medicines, non-medical meetings or appointments, work, or from getting things that you need?: No   Physical Activity: Not on file   Stress: Not on file   Social Connections: Not on file   Interpersonal Safety: Low Risk  (12/27/2023)    Interpersonal Safety     Do you feel physically and emotionally safe where you currently live?: Yes     Within the past 12 months, have you been hit, slapped, kicked or otherwise physically hurt by someone?: No     Within the past 12 months, have you been humiliated or emotionally abused in other ways by your partner or ex-partner?: No   Housing Stability: Low Risk  (12/26/2023)    Housing Stability     Do you have housing? : Yes     Are you worried about losing your housing?: No             Family History:     Family History   Problem Relation Age of Onset    Diabetes Father     Hypertension Father     Lipids Father     Cancer Father         Throat cancer    Cancer Paternal Grandmother         Lung cancer    Cerebrovascular Disease Paternal Grandfather     Neurologic Disorder Paternal Grandfather         parkinson's    Hypertension Maternal Grandmother     Hypertension Maternal Grandfather    None for headache or migraine          Allergies:    No Known Allergies          Medications:   Acute headache medications:  Tylenol and ibuprofen did not touch headaches  Exedrine migraine did not help headaches    Preventative headache medications:  None     Current Outpatient Medications:     tiZANidine (ZANAFLEX) 2 MG tablet, Take 1 tablet (2 mg) by mouth 3 times daily as needed for muscle spasms, Disp: 40 tablet, Rfl: 1 -rarely takes this   Multivitamin           Physical Exam:   /79   Pulse 65   Ht 1.727 m (5' 8\")   Wt 107 kg (236 lb)   SpO2 99%   BMI 35.88 kg/m     Physical Exam:   Neurologic:   Mental Status Exam: Alert, awake and oriented to situation. No dysarthria. Speech of normal " fluency.   Cranial Nerves: Fundoscopic exam with clear disc margins bilaterally. PERRLA, EOMs intact, no nystagmus, facial movements symmetric, facial sensation intact to light touch, hearing intact to conversation, trapezius and SCMs 5/5 bilaterally, tongue midline and fully mobile. No tongue atrophy.    Motor: Normal tone in all four extremities, no atrophy. 5/5 strength bilaterally in shoulder abduction, elbow extensors and flexors, wrist extensors and flexors, hip flexors, knee extensors and flexors, dorsi- and plantarflexion. No tremors or abnormal movements noted.   Sensory: Sensation intact to pinprick and vibration sensation on arms and legs bilaterally.    Coordination: Finger-nose-finger intact bilaterally.  Rapidly alternating movements intact bilaterally in the upper extremities.  Normal finger tapping bilaterally.  Intact heel-shin bilaterally. Normal Romberg.   Reflexes: 2+ and symmetric in triceps, biceps, brachioradialis, patellar, Achilles, and plantars downgoing bilaterally.   Gait: Normal gait.  Able to toe and heel walk.  Tandem gait normal.  Head: Normocephalic, atraumatic. No radiating pain with palpation over the supraorbital notches, occipital nerves.    Neck: Normal range of motion with lateral head movements and neck flexion.  Eyes: No conjunctival injection, no scleral icterus.           Data:     Narrative & Impression   MRI OF THE BRAIN WITHOUT AND WITH CONTRAST 1/10/2024 3:33 PM      COMPARISON: None.     HISTORY:  New headaches. Tension headache. Vision changes.      TECHNIQUE: Multi-sequence, multi-planar MRI images of the brain were  acquired before and after the administration of IV gadolinium (10 mL  Gadavist).     FINDINGS: The ventricles and basal cisterns are normal in  configuration. There is no midline shift. There are no extra-axial  fluid collections. Gray-white differentiation is well maintained.  There is no evidence for stroke or acute intracranial hemorrhage.  There is  no abnormal contrast enhancement in the brain or its  coverings.     There is no sinusitis or mastoiditis.                                                                      IMPRESSION: Normal brain MRI.        JESSICA ABRAMS MD

## 2024-02-15 ENCOUNTER — ANCILLARY PROCEDURE (OUTPATIENT)
Dept: MRI IMAGING | Facility: CLINIC | Age: 29
End: 2024-02-15
Attending: PSYCHIATRY & NEUROLOGY
Payer: COMMERCIAL

## 2024-02-15 DIAGNOSIS — R51.9 NONINTRACTABLE HEADACHE, UNSPECIFIED CHRONICITY PATTERN, UNSPECIFIED HEADACHE TYPE: ICD-10-CM

## 2024-02-15 PROCEDURE — 70544 MR ANGIOGRAPHY HEAD W/O DYE: CPT | Mod: TC | Performed by: RADIOLOGY

## 2024-02-15 PROCEDURE — A9585 GADOBUTROL INJECTION: HCPCS | Performed by: RADIOLOGY

## 2024-02-15 PROCEDURE — 70549 MR ANGIOGRAPH NECK W/O&W/DYE: CPT | Mod: TC | Performed by: RADIOLOGY

## 2024-02-15 RX ORDER — GADOBUTROL 604.72 MG/ML
10 INJECTION INTRAVENOUS ONCE
Status: COMPLETED | OUTPATIENT
Start: 2024-02-15 | End: 2024-02-15

## 2024-02-15 RX ADMIN — GADOBUTROL 10 ML: 604.72 INJECTION INTRAVENOUS at 11:10

## 2024-02-19 NOTE — RESULT ENCOUNTER NOTE
No aneurysm or stenosis to my eye on the MR brain and neck angiograms. Will meet back to discuss these findings.

## 2024-02-21 ENCOUNTER — THERAPY VISIT (OUTPATIENT)
Dept: PHYSICAL THERAPY | Facility: CLINIC | Age: 29
End: 2024-02-21
Payer: COMMERCIAL

## 2024-02-21 DIAGNOSIS — M54.2 NECK DISCOMFORT: Primary | ICD-10-CM

## 2024-02-21 DIAGNOSIS — G44.209 TENSION HEADACHE: ICD-10-CM

## 2024-02-21 PROCEDURE — 97110 THERAPEUTIC EXERCISES: CPT | Mod: GP | Performed by: PHYSICAL THERAPIST

## 2024-02-21 PROCEDURE — 97140 MANUAL THERAPY 1/> REGIONS: CPT | Mod: GP | Performed by: PHYSICAL THERAPIST

## 2024-05-07 ENCOUNTER — OFFICE VISIT (OUTPATIENT)
Dept: AUDIOLOGY | Facility: CLINIC | Age: 29
End: 2024-05-07
Payer: COMMERCIAL

## 2024-05-07 DIAGNOSIS — R42 DIZZINESS AND GIDDINESS: Primary | ICD-10-CM

## 2024-05-07 DIAGNOSIS — R42 DIZZINESS: ICD-10-CM

## 2024-05-07 PROCEDURE — 92519 VEMP TST I&R CERVICAL&OCULAR: CPT | Performed by: AUDIOLOGIST

## 2024-05-07 PROCEDURE — 92537 CALORIC VSTBLR TEST W/REC: CPT | Performed by: AUDIOLOGIST

## 2024-05-07 PROCEDURE — 92545 OSCILLATING TRACKING TEST: CPT | Mod: XU | Performed by: AUDIOLOGIST

## 2024-05-07 PROCEDURE — 92584 ELECTROCOCHLEOGRAPHY: CPT | Performed by: AUDIOLOGIST

## 2024-05-07 PROCEDURE — 92541 SPONTANEOUS NYSTAGMUS TEST: CPT | Performed by: AUDIOLOGIST

## 2024-05-07 PROCEDURE — 92567 TYMPANOMETRY: CPT | Performed by: AUDIOLOGIST

## 2024-05-07 PROCEDURE — 92542 POSITIONAL NYSTAGMUS TEST: CPT | Mod: XU | Performed by: AUDIOLOGIST

## 2024-05-07 NOTE — PROGRESS NOTES
"AUDIOLOGY REPORT    SUBJECTIVE: Gustavo Chappell was seen in the Audiology Clinic at the [facility] on 5/7/2024 for a vestibular evoked myogenic potential (VEMP) evaluation, referred by Jemal Link M.D.    The patient reports a chiropractic injury after a neck adjustment about a year ago which caused shaky vision and headaches. Santi reports he initially experienced a spinning sensation and his eyes would be \"jumpy\" up and down when laying down on his back to fall asleep. Patient reports he has been consistently dizzy every morning when he first gets up which last 5-10 minutes. During this time in the morning he states he is unable to focus his vision as it is slightly shaky still. Patient reports the shakiness is still present but is reduced compared to the initial episode. Patient reports some nausea with the initial episode which has also subsided. Patient reports he will have random episodes of dizziness where he will sit with his eyes closed until the sensation has passed. Patient reports experiencing some dizziness while tilting his head to the left. He reports his balance is good and that he has not had any falls. He has had imaging of the head and neck which has all been unremarkable.      Patient reports his headaches occur a few times a week and that they do not coincide with his morning dizziness. Patient reports sensitivity with bright lights which started with the headaches. Patient reports \"visual snow\" which he described as \"static on a TV\" that he sees all the time. Patient has seen Physical Therapy for the dizziness and his neck which had some noticeable improvement. Patient reports some motion intolerance if he is in the back seat of a moving car and is unable to read while in a car      The most recent hearing evaluation performed on 1/9/24 revealed normal hearing bilaterally. Patient reports bilateral high pitched tinnitus, but no fluctuations in " hearing or other otologic symptoms. Patient denies dizziness with coughing, sneezing, blowing his nose, and lifting heavy objects.  Patient denies history of eye surgeries, blurred/double vision, or cancer/chemotherapy.  Denies family history of hearing loss, migraines, and vertigo.    OBJECTIVE: VEMP testing is performed using an auditory evoked potentials system. Surface electrodes are placed in several locations on the patient's head and neck in order to record muscle motoneuron activity in response to loud auditory stimuli. This testing assesses very specific vestibular pathways in the inner ear and central nervous system. cVEMP testing is performed with electrodes placed on the patient's sternocleidomastoid muscle, and is used to assess the saccular organ, afferent inferior vestibular nerve and vestibular nuclei within the brainstem. oVEMP testing is performed with electrodes placed under the patient's eyes, and is used to assess the utricle and afferent superior vestibular nerve and nuclei within the brainstem.  Patients that may benefit from this procedure are those with complaints of vertigo and imbalance or those suspected of superior canal dehiscence. A total of 90 minutes was spent completing the VEMP testing today.    Tympanograms performed during ECochG prior to VEMP     RIGHT: normal eardrum mobility bilaterally.     LEFT: normal eardrum mobility bilaterally    cVEMP Thresholds via 500 Hz toneburst:    RIGHT: 85 dB nHL which  suggest normal saccular and inferior vestibular nerve function    LEFT: 85 dB nHL which  suggest normal saccular and inferior vestibular nerve function    AMPLITUDE ASYMMETRY: 4% (greater than 33% is considered abnormal)    oVEMP Thresholds via 500 Hz toneburst:     RIGHT: 90 dB nHL which suggests normal utricle and superior vestibular nerve function    LEFT: 85 dB nHL which suggests normal utricle and superior vestibular nerve function    AMPLITUDE ASYMMETRY: 44%; abnormal, right  being larger (greater than 33% is considered abnormal)    ASSESSMENT: Today's results suggest a normal VEMP evaluation. These results suggest normal saccular and inferior vestibular nerve function and normal utricle and superior vestibular nerve function.  Amplitude asymmetry was abnormal for oVEMPs. 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    PLAN:The patient will follow up with Jeaml Link M.D. for medical management. Please call this clinic with questions regarding these results or recommendations.    BREANNA Guallpa.  Audiology Doctoral Student  [place] #[id number]    I was present with the patient for the entire Audiology appointment including all procedures/testing performed by the AuD student, and agree with the student s assessment and plan as documented.      Sara Cruz.  Licensed Audiologist  [place] # [id number]

## 2024-05-07 NOTE — Clinical Note
Hi Dr. Link,   VNG testing showed a significant left hypofunction. No other abnormal findings on VNG. Ecog was normal bilaterally, and VEMP responses were normal. Amplitude asymmetry was abnormal for oVEMPs, left responses being smaller, but this is consistent with the left hypofunction found on calorics. Please see my notes for more detail.   Thanks, Juni

## 2024-05-07 NOTE — PROGRESS NOTES
"AUDIOLOGY REPORT-BALANCE ASSESSMENT    SUBJECTIVE: Gustavo Chappell, 29 year old, was seen in Audiology at the [facility] [place] on 5/7/2024, for videonystagmography (VNG), referred by Jemal Link M.D.     The patient reports a chiropractic injury after a neck adjustment about a year ago which caused shaky vision and headaches. Santi reports he initially experienced a spinning sensation and his eyes would be \"jumpy\" up and down when laying down on his back to fall asleep. Patient reports he has been consistently dizzy every morning when he first gets up which last 5-10 minutes. During this time in the morning he states he is unable to focus his vision as it is slightly shaky still. Patient reports the shakiness is still present but is reduced compared to the initial episode. Patient reports some nausea with the initial episode which has also subsided. Patient reports he will have random episodes of dizziness where he will sit with his eyes closed until the sensation has passed. Patient reports experiencing some dizziness while tilting his head to the left. He reports his balance is good and that he has not had any falls. He has had imaging of the head and neck which has all been unremarkable.      Patient reports his headaches occur a few times a week and that they do not coincide with his morning dizziness. Patient reports sensitivity with bright lights which started with the headaches. Patient reports \"visual snow\" which he described as \"static on a TV\" that he sees all the time. Patient has seen Physical Therapy for the dizziness and his neck which had some noticeable improvement. Patient reports some motion intolerance if he is in the back seat of a moving car and is unable to read while in a car      The most recent hearing evaluation performed on 1/9/24 revealed normal hearing bilaterally. Patient reports bilateral high pitched tinnitus, but no fluctuations in hearing or other " otologic symptoms. Patient denies dizziness with coughing, sneezing, blowing his nose, and lifting heavy objects.  Patient denies history of eye surgeries, blurred/double vision, or cancer/chemotherapy.  Denies family history of hearing loss, migraines, and vertigo. Gustavo has not taken any antivestibular medications in the past 48 hours.    OBJECTIVE:  Abuse Screening:  Do you feel unsafe at home or work/school? No  Do you feel threatened by someone? No  Does anyone try to keep you from having contact with others, or doing things outside of your home? No  Physical signs of abuse present? No    Dizziness Handicap Inventory (DHI): 20/100; mild perceived impairment    Videonystagmography (VNG) testing:  Prescreening:  Tympanograms: normal eardrum mobility bilaterally (performed earlier today during ECOG appointment). Note: this test is completed to determine the status of the middle ear before irrigations are completed.  Ocular range of motion and ocular counter roll: Normal  Cross/cover:Normal  Head Thrust: Negative     Nystagmus Tests:  Gaze-Horizontal with fixation:   Center: Normal   Right: Normal   Left: Normal  Gaze-Vertical with fixation:   Up: Normal   Down: Normal  Gaze with fixation denied:   Center: Normal   Right: Normal   Left: Normal   Up: Normal  High Frequency Headshake:   Horizontal: Negative for nystagmus and symptoms   Vertical: Negative for nystagmus and symptoms    [place]-Hallpike Head Right: Negative for PC-BPPV. No nystagmus or symptoms   [place]-Hallpike Head Left: Negative for PC-BPPV. No nystagmus or symptoms supine.  Patient reported slight lightheadedness when sitting up.  Roll Test Head Right: Negative for HC-BPPV. No nystagmus or symptoms   Roll Test Head Left: Negative for HC-BPPV. No nystagmus or symptoms     Positional Testing:  Positionals: Supine: Normal; no nystagmus  Positionals: Body Right: Normal; no nystagmus.  Patient reported slight dizziness  Positionals: Body Left: Normal; no  nystagmus.  Patient reported slight dizziness  Positionals: Pre-Caloric: Normal; no nystagmus.  Patient reported slight dizziness and slight sensation of eye shaking    Oculomotor Tests:  Saccades: Normal  Anti-saccades: Normal; Patient able to perform task  Pursuit: Normal    Calorics :  (Tested at 44 degrees and 30 degrees Celsius for 30 seconds for warm and cool water, respectively):  Right Warm Eye Speed: 59 degrees per second right beating  Left Warm Eye Speed: 12 degrees per second left beating  Right Cool Eye Speed: 20 degrees per second left beating  Left Cool Eye Speed: 9 degrees per second right beating  Difference between ear: 57% left hypofunction. (Greater than 25% considered clinically significant.)  Fixation Index: Normal  Overall caloric test: Abnormal: 57% left hypofunction    Post-Calorics Otoscopy: Normal    ASSESSMENT:  1. There were no significant indications of central vestibular system involvement noted on today's exam.     2. Indications of peripheral vestibular system involvement noted on today's exam were as follows:   - 57% left hypofunction found on Caloric testing    PLAN:  Follow-up with Jemal Link M.D. regarding today's results and for medical management.  Please call this clinic at [phone] with questions regarding these results or recommendations.       Sara Cruz.  Licensed Audiologist  [place] # [id number]

## 2024-05-07 NOTE — PROGRESS NOTES
"AUDIOLOGY REPORT    BACKGROUND INFORMATION: Gustavo Chappell was seen in Audiology at the [facility] [place] on 5/7/2024 for an electrocochleography (ECochG) evaluation, referred by Jemal Link MD.     The patient reports a chiropractic injury after a neck adjustment about a year ago which caused shaky vision and headaches. Santi reports he initially experienced a spinning sensation and his eyes would be \"jumpy\" up and down when laying down on his back to fall asleep. Patient reports he has been consistently dizzy every morning when he first gets up which last 5-10 minutes. During this time in the morning he states he is unable to focus his vision as it is slightly shaky still. Patient reports the shakiness is still present but is reduced compared to the initial episode. Patient reports some nausea with the initial episode which has also subsided. Patient reports he will have random episodes of dizziness where he will sit with his eyes closed until the sensation has passed. Patient reports experiencing some dizziness while tilting his head to the left. He reports his balance is good and that he has not had any falls. He has had imaging of the head and neck which has all been unremarkable.      Patient reports his headaches occur a few times a week and that they do not coincide with his morning dizziness. Patient reports sensitivity with bright lights which started with the headaches. Patient reports \"visual snow\" which he described as \"static on a TV\" that he sees all the time. Patient has seen Physical Therapy for the dizziness and his neck which had some noticeable improvement. Patient reports some motion intolerance if he is in the back seat of a moving car and is unable to read while in a car      The most recent hearing evaluation performed on 1/9/24 revealed normal hearing bilaterally. Patient reports bilateral high pitched tinnitus, but no fluctuations in hearing or " other otologic symptoms. Patient denies dizziness with coughing, sneezing, blowing his nose, and lifting heavy objects.  Patient denies history of eye surgeries, blurred/double vision, or cancer/chemotherapy.  Denies family history of hearing loss, migraines, and vertigo.    TEST RESULTS AND PROCEDURES:   Abuse Screening:  Do you feel unsafe at home or work/school? No  Do you feel threatened by someone? No  Does anyone try to keep you from having contact with others, or doing things outside of your home? No  Physical signs of abuse present? No    Electrocochleography (ECochG) testing is performed using an auditory evoked potentials system.  Surface electrodes are placed behind the test ear with extratympanic tymptrode placed in the test ear in order to obtain a near-field recording that measures the response of the cochlear hair cells and auditory nerve in response to auditory stimuli. The measured response consists of the summating potential (SP) and the cochlear nerve action potential (AP). Abnormalities may take the form of an increased summating potential/compound action potential (SP/AP) ratio (due to an increased summating potential) in patients suspected of Meniere's disease (endolymphatic hydrops) or in those patients who have symptoms of vestibular dysfunction or ear symptoms including asymmetric or fluctuating hearing loss, tinnitus and/or aural fullness. The following can be suggestive of an abnormal EcochG: SP/AP ratio exceeds 0.43.    Tympanograms showed normal eardrum mobility bilaterally. Using a microscope tympanic membranes were visualized.       A two-channel ECochG recording was performed for clicks bilaterally.  Clicks for the right ear showed normal SP/AP ratios.    Clicks for the left ear showed normal SP/AP ratios.         Click SP/AP ratio   Right ear  0.275   Left ear  0.357       Abnormal SP/AP ratios must be greater than .43 for clicks.     SUMMARY AND RECOMMENDATIONS: Today s ECochG  revealed normal SP/AP ratios bilaterally.  Please call this clinic with questions regarding today s results.  Follow-up with Jemal Link M.D. for medical management.        BREANNA Guallpa.  Audiology Doctoral Student  [place] #[id number]    I was present with the patient for the entire Audiology appointment including all procedures/testing performed by the AuD student, and agree with the student s assessment and plan as documented.    Sara Cruz.  Licensed Audiologist  [place] # [id number]

## 2024-05-13 ENCOUNTER — MYC MEDICAL ADVICE (OUTPATIENT)
Dept: OTOLARYNGOLOGY | Facility: CLINIC | Age: 29
End: 2024-05-13
Payer: COMMERCIAL

## 2024-05-13 DIAGNOSIS — R42 DIZZINESS: Primary | ICD-10-CM

## 2024-05-13 DIAGNOSIS — H81.8X2 LEFT-SIDED VESTIBULAR WEAKNESS: ICD-10-CM

## 2024-06-16 ENCOUNTER — MYC MEDICAL ADVICE (OUTPATIENT)
Dept: FAMILY MEDICINE | Facility: CLINIC | Age: 29
End: 2024-06-16
Payer: COMMERCIAL

## 2024-06-16 DIAGNOSIS — M54.2 NECK PAIN: Primary | ICD-10-CM

## 2024-06-19 ENCOUNTER — TRANSFERRED RECORDS (OUTPATIENT)
Dept: HEALTH INFORMATION MANAGEMENT | Facility: CLINIC | Age: 29
End: 2024-06-19
Payer: COMMERCIAL

## 2024-06-20 PROBLEM — G44.209 TENSION HEADACHE: Status: RESOLVED | Noted: 2023-11-30 | Resolved: 2024-06-20

## 2024-06-20 PROBLEM — M54.2 NECK DISCOMFORT: Status: RESOLVED | Noted: 2023-11-30 | Resolved: 2024-06-20

## 2024-06-20 NOTE — PROGRESS NOTES
DISCHARGE  Reason for Discharge: Patient has failed to schedule further appointments.    Equipment Issued:     Discharge Plan: Patient to continue home program.    Referring Provider:  Mario Sebastian

## 2024-09-12 ENCOUNTER — TELEPHONE (OUTPATIENT)
Dept: OPHTHALMOLOGY | Facility: CLINIC | Age: 29
End: 2024-09-12
Payer: COMMERCIAL

## 2024-09-12 NOTE — TELEPHONE ENCOUNTER
" Health Call Center    Phone Message    May a detailed message be left on voicemail: yes     Reason for Call: Symptoms or Concerns     If patient has red-flag symptoms, warm transfer to triage line    Current symptom or concern: Pt's wife Ade reports that pt has had floaters, \"sparkling\", moving spots and other visual disturbances that his optometrist at [facility] has not been able to find sources of. Preferred location is [facility].Sending TE for floaters.    Symptoms have been present for:  Several month(s)    Has patient previously been seen for this? No    By : N/A    Date: N/A    Are there any new or worsening symptoms? No    Action Taken: Message routed to:  Other: [facility]    Travel Screening: Not Applicable     Date of Service:                                                                      "

## 2024-09-12 NOTE — TELEPHONE ENCOUNTER
Floaters, flashes and black hair like floaters both eyes x 1 year, mostly noticeable when outdoors or looking at a blank wall or when it is very dark. Not wanting a CE, only wants a 2nd opinion. Scheduled with Dr. Diego on 9/30/24.

## 2024-12-30 ENCOUNTER — VIRTUAL VISIT (OUTPATIENT)
Dept: FAMILY MEDICINE | Facility: CLINIC | Age: 29
End: 2024-12-30
Payer: COMMERCIAL

## 2024-12-30 DIAGNOSIS — F41.9 ANXIETY: Primary | ICD-10-CM

## 2024-12-30 DIAGNOSIS — H54.7 PROBLEM FOCUSING EYES: ICD-10-CM

## 2024-12-30 PROCEDURE — 99213 OFFICE O/P EST LOW 20 MIN: CPT | Mod: 95 | Performed by: FAMILY MEDICINE

## 2024-12-30 RX ORDER — SERTRALINE HYDROCHLORIDE 25 MG/1
TABLET, FILM COATED ORAL
Qty: 173 TABLET | Refills: 0 | Status: SHIPPED | OUTPATIENT
Start: 2024-12-30 | End: 2025-03-30

## 2024-12-30 RX ORDER — MULTIPLE VITAMINS W/ MINERALS TAB 9MG-400MCG
1 TAB ORAL DAILY
COMMUNITY

## 2024-12-30 ASSESSMENT — PAIN SCALES - PAIN ENJOYMENT GENERAL ACTIVITY SCALE (PEG)
INTERFERED_GENERAL_ACTIVITY: 0
AVG_PAIN_PASTWEEK: 1
INTERFERED_ENJOYMENT_LIFE: 0
PEG_TOTALSCORE: 0.33
PEG_TOTALSCORE: .33
INTERFERED_ENJOYMENT_LIFE: 0 - DOES NOT INTERFERE
INTERFERED_GENERAL_ACTIVITY: 0 - DOES NOT INTERFERE
AVG_PAIN_PASTWEEK: 1

## 2024-12-30 ASSESSMENT — ANXIETY QUESTIONNAIRES
8. IF YOU CHECKED OFF ANY PROBLEMS, HOW DIFFICULT HAVE THESE MADE IT FOR YOU TO DO YOUR WORK, TAKE CARE OF THINGS AT HOME, OR GET ALONG WITH OTHER PEOPLE?: NOT DIFFICULT AT ALL
2. NOT BEING ABLE TO STOP OR CONTROL WORRYING: SEVERAL DAYS
5. BEING SO RESTLESS THAT IT IS HARD TO SIT STILL: NOT AT ALL
GAD7 TOTAL SCORE: 8
GAD7 TOTAL SCORE: 8
IF YOU CHECKED OFF ANY PROBLEMS ON THIS QUESTIONNAIRE, HOW DIFFICULT HAVE THESE PROBLEMS MADE IT FOR YOU TO DO YOUR WORK, TAKE CARE OF THINGS AT HOME, OR GET ALONG WITH OTHER PEOPLE: NOT DIFFICULT AT ALL
3. WORRYING TOO MUCH ABOUT DIFFERENT THINGS: NOT AT ALL
7. FEELING AFRAID AS IF SOMETHING AWFUL MIGHT HAPPEN: NOT AT ALL
1. FEELING NERVOUS, ANXIOUS, OR ON EDGE: NEARLY EVERY DAY
7. FEELING AFRAID AS IF SOMETHING AWFUL MIGHT HAPPEN: NOT AT ALL
6. BECOMING EASILY ANNOYED OR IRRITABLE: SEVERAL DAYS
4. TROUBLE RELAXING: NEARLY EVERY DAY
GAD7 TOTAL SCORE: 8

## 2024-12-30 ASSESSMENT — PATIENT HEALTH QUESTIONNAIRE - PHQ9
SUM OF ALL RESPONSES TO PHQ QUESTIONS 1-9: 2
SUM OF ALL RESPONSES TO PHQ QUESTIONS 1-9: 2
10. IF YOU CHECKED OFF ANY PROBLEMS, HOW DIFFICULT HAVE THESE PROBLEMS MADE IT FOR YOU TO DO YOUR WORK, TAKE CARE OF THINGS AT HOME, OR GET ALONG WITH OTHER PEOPLE: NOT DIFFICULT AT ALL

## 2024-12-30 ASSESSMENT — ENCOUNTER SYMPTOMS: EYE PAIN: 1

## 2024-12-30 NOTE — PROGRESS NOTES
Santi is a 29 year old who is being evaluated via a billable video visit.    What phone number would you like to be contacted at? [phone]  How would you like to obtain your AVS? MyChart      Assessment & Plan     Anxiety  --Plan to start on sertraline 25 mg for first week then up titration to 50 mg daily after that.  Follow-up as scheduled for annual physical on 1/13/2025.  Medication and potential side effects discussed in full.  - sertraline (ZOLOFT) 25 MG tablet  Dispense: 173 tablet; Refill: 0    Problem focusing eyes  Has met with ophthalmology, neurology, ENT.  It was recommended that he proceed with vestibular rehab.  Has not yet started this.  Number provided today. He is now wondering if there is an anxiety component to his symptoms and wishes to start on something for anxiety.       The risks, benefits and treatment options of prescribed medications or other treatments have been discussed with the patient. The patient verbalized their understanding and should call or follow up if no improvement or if they develop further problems.      Subjective   Santi is a 29 year old, presenting for the following health issues:  Eye Problem      12/30/2024     3:22 PM   Additional Questions   Roomed by Rafaela HOLCOMB     Eye Problem          Hard to focus on things especially if it is bright out.  Symptoms are worse in the morning.       Continues to have eye issues at this time.   Reports difficulty with focusing on things with his vision. Difficult for him to keep a steady gaze on things. Feels like will get some flickering in his periphery     Has met with ENT  Was referred to vestibular rehab but has not yet done this.     Has met with Neurology in the past.   Underwent MRI BRAIN, MRA head, MRA neck which were unremarkable.     Checking blood pressure at home. Well controlled when checking at 130/80.     He thinks it could be related to anxiety and wishes to try a medication for this.           Objective    Vitals -  Patient Reported  Pain Score: Mild Pain (2)  Pain Loc: Neck        Physical Exam   GENERAL: alert and no distress  EYES: Eyes grossly normal to inspection.  No discharge or erythema, or obvious scleral/conjunctival abnormalities.  RESP: No audible wheeze, cough, or visible cyanosis.    SKIN: Visible skin clear. No significant rash, abnormal pigmentation or lesions.  NEURO: Cranial nerves grossly intact.  Mentation and speech appropriate for age.  PSYCH: Appropriate affect, tone, and pace of words          Video-Visit Details    Type of service:  Video Visit   Originating Location (pt. Location): Home    Distant Location (provider location):  On-site  Platform used for Video Visit: Mikki  Signed Electronically by: Mario Sebastian DO

## 2024-12-30 NOTE — PATIENT INSTRUCTIONS
please call [phone] for vestibular rehab.       Start on sertraline 25 mg  for first week, then increase to 50 mg daily after that.

## 2025-01-03 PROBLEM — M54.2 NECK PAIN: Status: ACTIVE | Noted: 2023-11-30

## 2025-01-03 PROBLEM — R42 DIZZINESS: Status: ACTIVE | Noted: 2025-01-03

## 2025-01-06 PROBLEM — H54.7 PROBLEM FOCUSING EYES: Status: ACTIVE | Noted: 2025-01-06

## 2025-01-12 SDOH — HEALTH STABILITY: PHYSICAL HEALTH: ON AVERAGE, HOW MANY DAYS PER WEEK DO YOU ENGAGE IN MODERATE TO STRENUOUS EXERCISE (LIKE A BRISK WALK)?: 3 DAYS

## 2025-01-12 SDOH — HEALTH STABILITY: PHYSICAL HEALTH: ON AVERAGE, HOW MANY MINUTES DO YOU ENGAGE IN EXERCISE AT THIS LEVEL?: 30 MIN

## 2025-01-12 ASSESSMENT — SOCIAL DETERMINANTS OF HEALTH (SDOH): HOW OFTEN DO YOU GET TOGETHER WITH FRIENDS OR RELATIVES?: ONCE A WEEK

## 2025-01-13 ENCOUNTER — OFFICE VISIT (OUTPATIENT)
Dept: FAMILY MEDICINE | Facility: CLINIC | Age: 30
End: 2025-01-13
Payer: COMMERCIAL

## 2025-01-13 VITALS
SYSTOLIC BLOOD PRESSURE: 120 MMHG | TEMPERATURE: 98 F | BODY MASS INDEX: 35.31 KG/M2 | HEIGHT: 68 IN | HEART RATE: 68 BPM | RESPIRATION RATE: 18 BRPM | OXYGEN SATURATION: 98 % | WEIGHT: 233 LBS | DIASTOLIC BLOOD PRESSURE: 80 MMHG

## 2025-01-13 DIAGNOSIS — R73.01 ELEVATED FASTING GLUCOSE: ICD-10-CM

## 2025-01-13 DIAGNOSIS — Z13.1 SCREENING FOR DIABETES MELLITUS: ICD-10-CM

## 2025-01-13 DIAGNOSIS — F41.9 ANXIETY: ICD-10-CM

## 2025-01-13 DIAGNOSIS — H43.393 VITREOUS FLOATERS OF BOTH EYES: ICD-10-CM

## 2025-01-13 DIAGNOSIS — Z13.220 SCREENING CHOLESTEROL LEVEL: ICD-10-CM

## 2025-01-13 DIAGNOSIS — Z00.00 ROUTINE GENERAL MEDICAL EXAMINATION AT A HEALTH CARE FACILITY: Primary | ICD-10-CM

## 2025-01-13 LAB
ALBUMIN SERPL BCG-MCNC: 4.9 G/DL (ref 3.5–5.2)
ALP SERPL-CCNC: 47 U/L (ref 40–150)
ALT SERPL W P-5'-P-CCNC: 37 U/L (ref 0–70)
ANION GAP SERPL CALCULATED.3IONS-SCNC: 10 MMOL/L (ref 7–15)
AST SERPL W P-5'-P-CCNC: 24 U/L (ref 0–45)
BASOPHILS # BLD AUTO: 0 10E3/UL (ref 0–0.2)
BASOPHILS NFR BLD AUTO: 0 %
BILIRUB SERPL-MCNC: 0.6 MG/DL
BUN SERPL-MCNC: 16.6 MG/DL (ref 6–20)
CALCIUM SERPL-MCNC: 9.9 MG/DL (ref 8.8–10.4)
CHLORIDE SERPL-SCNC: 99 MMOL/L (ref 98–107)
CHOLEST SERPL-MCNC: 173 MG/DL
CREAT SERPL-MCNC: 0.96 MG/DL (ref 0.67–1.17)
CRP SERPL-MCNC: <3 MG/L
EGFRCR SERPLBLD CKD-EPI 2021: >90 ML/MIN/1.73M2
EOSINOPHIL # BLD AUTO: 0.2 10E3/UL (ref 0–0.7)
EOSINOPHIL NFR BLD AUTO: 3 %
ERYTHROCYTE [DISTWIDTH] IN BLOOD BY AUTOMATED COUNT: 11.8 % (ref 10–15)
FASTING STATUS PATIENT QL REPORTED: YES
FASTING STATUS PATIENT QL REPORTED: YES
GLUCOSE SERPL-MCNC: 110 MG/DL (ref 70–99)
HCO3 SERPL-SCNC: 27 MMOL/L (ref 22–29)
HCT VFR BLD AUTO: 49 % (ref 40–53)
HDLC SERPL-MCNC: 44 MG/DL
HGB BLD-MCNC: 16.4 G/DL (ref 13.3–17.7)
IMM GRANULOCYTES # BLD: 0 10E3/UL
IMM GRANULOCYTES NFR BLD: 0 %
LDLC SERPL CALC-MCNC: 112 MG/DL
LYMPHOCYTES # BLD AUTO: 2.3 10E3/UL (ref 0.8–5.3)
LYMPHOCYTES NFR BLD AUTO: 37 %
MCH RBC QN AUTO: 28.8 PG (ref 26.5–33)
MCHC RBC AUTO-ENTMCNC: 33.5 G/DL (ref 31.5–36.5)
MCV RBC AUTO: 86 FL (ref 78–100)
MONOCYTES # BLD AUTO: 0.4 10E3/UL (ref 0–1.3)
MONOCYTES NFR BLD AUTO: 7 %
NEUTROPHILS # BLD AUTO: 3.3 10E3/UL (ref 1.6–8.3)
NEUTROPHILS NFR BLD AUTO: 53 %
NONHDLC SERPL-MCNC: 129 MG/DL
PLATELET # BLD AUTO: 270 10E3/UL (ref 150–450)
POTASSIUM SERPL-SCNC: 4.3 MMOL/L (ref 3.4–5.3)
PROT SERPL-MCNC: 7.9 G/DL (ref 6.4–8.3)
RBC # BLD AUTO: 5.69 10E6/UL (ref 4.4–5.9)
SODIUM SERPL-SCNC: 136 MMOL/L (ref 135–145)
TRIGL SERPL-MCNC: 87 MG/DL
TSH SERPL DL<=0.005 MIU/L-ACNC: 1.6 UIU/ML (ref 0.3–4.2)
WBC # BLD AUTO: 6.2 10E3/UL (ref 4–11)

## 2025-01-13 PROCEDURE — 80053 COMPREHEN METABOLIC PANEL: CPT | Performed by: FAMILY MEDICINE

## 2025-01-13 PROCEDURE — G2211 COMPLEX E/M VISIT ADD ON: HCPCS | Performed by: FAMILY MEDICINE

## 2025-01-13 PROCEDURE — 85025 COMPLETE CBC W/AUTO DIFF WBC: CPT | Performed by: FAMILY MEDICINE

## 2025-01-13 PROCEDURE — 90471 IMMUNIZATION ADMIN: CPT | Performed by: FAMILY MEDICINE

## 2025-01-13 PROCEDURE — 83036 HEMOGLOBIN GLYCOSYLATED A1C: CPT | Performed by: FAMILY MEDICINE

## 2025-01-13 PROCEDURE — 84443 ASSAY THYROID STIM HORMONE: CPT | Performed by: FAMILY MEDICINE

## 2025-01-13 PROCEDURE — 36415 COLL VENOUS BLD VENIPUNCTURE: CPT | Performed by: FAMILY MEDICINE

## 2025-01-13 PROCEDURE — 80061 LIPID PANEL: CPT | Performed by: FAMILY MEDICINE

## 2025-01-13 PROCEDURE — 90656 IIV3 VACC NO PRSV 0.5 ML IM: CPT | Performed by: FAMILY MEDICINE

## 2025-01-13 PROCEDURE — 99214 OFFICE O/P EST MOD 30 MIN: CPT | Mod: 25 | Performed by: FAMILY MEDICINE

## 2025-01-13 PROCEDURE — 96127 BRIEF EMOTIONAL/BEHAV ASSMT: CPT | Performed by: FAMILY MEDICINE

## 2025-01-13 PROCEDURE — 99395 PREV VISIT EST AGE 18-39: CPT | Mod: 25 | Performed by: FAMILY MEDICINE

## 2025-01-13 PROCEDURE — 86140 C-REACTIVE PROTEIN: CPT | Performed by: FAMILY MEDICINE

## 2025-01-13 RX ORDER — SERTRALINE HYDROCHLORIDE 25 MG/1
50 TABLET, FILM COATED ORAL DAILY
Qty: 180 TABLET | Refills: 3 | Status: SHIPPED | OUTPATIENT
Start: 2025-01-13

## 2025-01-13 ASSESSMENT — ANXIETY QUESTIONNAIRES
3. WORRYING TOO MUCH ABOUT DIFFERENT THINGS: SEVERAL DAYS
2. NOT BEING ABLE TO STOP OR CONTROL WORRYING: NOT AT ALL
5. BEING SO RESTLESS THAT IT IS HARD TO SIT STILL: NOT AT ALL
1. FEELING NERVOUS, ANXIOUS, OR ON EDGE: SEVERAL DAYS
6. BECOMING EASILY ANNOYED OR IRRITABLE: NOT AT ALL
GAD7 TOTAL SCORE: 3
GAD7 TOTAL SCORE: 3
IF YOU CHECKED OFF ANY PROBLEMS ON THIS QUESTIONNAIRE, HOW DIFFICULT HAVE THESE PROBLEMS MADE IT FOR YOU TO DO YOUR WORK, TAKE CARE OF THINGS AT HOME, OR GET ALONG WITH OTHER PEOPLE: NOT DIFFICULT AT ALL
7. FEELING AFRAID AS IF SOMETHING AWFUL MIGHT HAPPEN: NOT AT ALL

## 2025-01-13 ASSESSMENT — PATIENT HEALTH QUESTIONNAIRE - PHQ9
5. POOR APPETITE OR OVEREATING: SEVERAL DAYS
SUM OF ALL RESPONSES TO PHQ QUESTIONS 1-9: 0

## 2025-01-13 ASSESSMENT — PAIN SCALES - GENERAL: PAINLEVEL_OUTOF10: NO PAIN (0)

## 2025-01-13 ASSESSMENT — ENCOUNTER SYMPTOMS: NERVOUS/ANXIOUS: 1

## 2025-01-13 NOTE — PATIENT INSTRUCTIONS
Patient Education       Lab work today for cholesterol and diabetic screen and for further evaluation of the visual floaters.     Continue with the physical therapy.     Continue on sertraline 50 mg daily.   Follow up with myself in 2-3 months.         Preventive Care Advice   This is general advice given by our system to help you stay healthy. However, your care team may have specific advice just for you. Please talk to your care team about your preventive care needs.  Nutrition  Eat 5 or more servings of fruits and vegetables each day.  Try wheat bread, brown rice and whole grain pasta (instead of white bread, rice, and pasta).  Get enough calcium and vitamin D. Check the label on foods and aim for 100% of the RDA (recommended daily allowance).  Lifestyle  Exercise at least 150 minutes each week  (30 minutes a day, 5 days a week).  Do muscle strengthening activities 2 days a week. These help control your weight and prevent disease.  No smoking.  Wear sunscreen to prevent skin cancer.  Have a dental exam and cleaning every 6 months.  Yearly exams  See your health care team every year to talk about:  Any changes in your health.  Any medicines your care team has prescribed.  Preventive care, family planning, and ways to prevent chronic diseases.  Shots (vaccines)   HPV shots (up to age 26), if you've never had them before.  Hepatitis B shots (up to age 59), if you've never had them before.  COVID-19 shot: Get this shot when it's due.  Flu shot: Get a flu shot every year.  Tetanus shot: Get a tetanus shot every 10 years.  Pneumococcal, hepatitis A, and RSV shots: Ask your care team if you need these based on your risk.  Shingles shot (for age 50 and up)  General health tests  Diabetes screening:  Starting at age 35, Get screened for diabetes at least every 3 years.  If you are younger than age 35, ask your care team if you should be screened for diabetes.  Cholesterol test: At age 39, start having a cholesterol test  every 5 years, or more often if advised.  Bone density scan (DEXA): At age 50, ask your care team if you should have this scan for osteoporosis (brittle bones).  Hepatitis C: Get tested at least once in your life.  STIs (sexually transmitted infections)  Before age 24: Ask your care team if you should be screened for STIs.  After age 24: Get screened for STIs if you're at risk. You are at risk for STIs (including HIV) if:  You are sexually active with more than one person.  You don't use condoms every time.  You or a partner was diagnosed with a sexually transmitted infection.  If you are at risk for HIV, ask about PrEP medicine to prevent HIV.  Get tested for HIV at least once in your life, whether you are at risk for HIV or not.  Cancer screening tests  Cervical cancer screening: If you have a cervix, begin getting regular cervical cancer screening tests starting at age 21.  Breast cancer scan (mammogram): If you've ever had breasts, begin having regular mammograms starting at age 40. This is a scan to check for breast cancer.  Colon cancer screening: It is important to start screening for colon cancer at age 45.  Have a colonoscopy test every 10 years (or more often if you're at risk) Or, ask your provider about stool tests like a FIT test every year or Cologuard test every 3 years.  To learn more about your testing options, visit:   .  For help making a decision, visit:   https://bit.ly/fu29377.  Prostate cancer screening test: If you have a prostate, ask your care team if a prostate cancer screening test (PSA) at age 55 is right for you.  Lung cancer screening: If you are a current or former smoker ages 50 to 80, ask your care team if ongoing lung cancer screenings are right for you.  For informational purposes only. Not to replace the advice of your health care provider. Copyright   2023 [employer]. All rights reserved. Clinically reviewed by the [facility] Transitions Program. [employer]!  790674 - REV 01/24.

## 2025-01-13 NOTE — PROGRESS NOTES
"Preventive Care Visit  [facility]  Mario Sebastian DO, Family Medicine  Jan 13, 2025      Assessment & Plan     Routine general medical examination at a health care facility  Discussed healthy diet and exercise. Cholesterol and diabetic screen today.    Screening for diabetes mellitus  Fasting glucose  - Comprehensive metabolic panel (BMP + Alb, Alk Phos, ALT, AST, Total. Bili, TP)    Screening cholesterol level  Fasting  - Lipid panel reflex to direct LDL Fasting    Anxiety  Recently started.  On sertraline 50 mg daily.  Plan to follow-up in 2 to 3 months.  Tolerating medication.  A little early to see if fully helpful.  - sertraline (ZOLOFT) 25 MG tablet  Dispense: 180 tablet; Refill: 3    Vitreous floaters of both eyes  Has seen ophthalmology, neurology, ENT. ENT recommended vestibular rehab which he recently started.  No recent lab work and will check lab work as below.  Continue with vestibular rehab.  Follow-up in 2 to 3 months.  - CBC with platelets and differential  - Comprehensive metabolic panel (BMP + Alb, Alk Phos, ALT, AST, Total. Bili, TP)  - TSH with free T4 reflex  - CRP, inflammation      Patient has been advised of split billing requirements and indicates understanding: Yes    The risks, benefits and treatment options of prescribed medications or other treatments have been discussed with the patient. The patient verbalized their understanding and should call or follow up if no improvement or if they develop further problems.    The longitudinal plan of care for the diagnosis(es)/condition(s) as documented were addressed during this visit. Due to the added complexity in care, I will continue to support patient in the subsequent management and with ongoing continuity of care.        BMI  Estimated body mass index is 35.11 kg/m  as calculated from the following:    Height as of this encounter: 1.735 m (5' 8.31\").    Weight as of this encounter: 105.7 kg (233 lb).   Weight " management plan: Discussed healthy diet and exercise guidelines    Counseling  Appropriate preventive services were addressed with this patient via screening, questionnaire, or discussion as appropriate for fall prevention, nutrition, physical activity, Tobacco-use cessation, social engagement, weight loss and cognition.  Checklist reviewing preventive services available has been given to the patient.  Reviewed patient's diet, addressing concerns and/or questions.   He is at risk for lack of exercise and has been provided with information to increase physical activity for the benefit of his well-being.         Subjective   Santi is a 29 year old, presenting for the following:  Physical and Anxiety        1/13/2025     6:49 AM   Additional Questions   Roomed by Rafaela HOLCOMB          29 year old male who presents to clinic for annual physical and follow up on anxiety.           Neck pain   Has met with [facility] in the past.   Did a cervical spine MRI. It was recommended to do physical therapy. This helped with the neck discomfort.   Also underwent a cervical spine injection ( through [facility]) of the neck this past summer. Which was helpful.       Continues to have vision floaters.  Has seen ophtalmology, ENT and also Neurology. Workup thus far   Now doing vestibular therapy for this as recommended by ENT.       Anxiety          Anxiety   How are you doing with your anxiety since your last visit? Improved   Are you having other symptoms that might be associated with anxiety? No  Have you had a significant life event? No   Are you feeling depressed? No  Do you have any concerns with your use of alcohol or other drugs? No    On sertraline 50 mg daily for the last week. Seems to be helpful. Diarrhea initially which has now resolved.       Social History     Tobacco Use    Smoking status: Never    Smokeless tobacco: Former     Types: Chew    Tobacco comments:     2-3 tins a month. Quit in May   Vaping Use    Vaping status: Never Used    Substance Use Topics    Alcohol use: Yes     Comment: During the weekends.    Drug use: No         12/30/2024     3:21 PM 1/13/2025     6:49 AM   RAMONA-7 SCORE   Total Score 8 (mild anxiety)    Total Score 8  3       Proxy-reported         12/30/2024     3:19 PM 1/13/2025     6:49 AM   PHQ   PHQ-9 Total Score 2  0   Q9: Thoughts of better off dead/self-harm past 2 weeks Not at all  Not at all       Proxy-reported         1/13/2025     6:49 AM   Last PHQ-9   1.  Little interest or pleasure in doing things 0   2.  Feeling down, depressed, or hopeless 0   3.  Trouble falling or staying asleep, or sleeping too much 0   4.  Feeling tired or having little energy 0   5.  Poor appetite or overeating 0   6.  Feeling bad about yourself 0   7.  Trouble concentrating 0   8.  Moving slowly or restless 0   Q9: Thoughts of better off dead/self-harm past 2 weeks 0   PHQ-9 Total Score 0         1/13/2025     6:49 AM   RAMONA-7    1. Feeling nervous, anxious, or on edge 1   2. Not being able to stop or control worrying 0   3. Worrying too much about different things 1   4. Trouble relaxing 1   5. Being so restless that it is hard to sit still 0   6. Becoming easily annoyed or irritable 0   7. Feeling afraid, as if something awful might happen 0   RAMONA-7 Total Score 3   If you checked any problems, how difficult have they made it for you to do your work, take care of things at home, or get along with other people? Not difficult at all       Health Care Directive  Patient does not have a Health Care Directive: Patient states has Advance Directive and will bring in a copy to clinic.      1/12/2025   General Health   How would you rate your overall physical health? Good   Feel stress (tense, anxious, or unable to sleep) Only a little   (!) STRESS CONCERN      1/12/2025   Nutrition   Three or more servings of calcium each day? Yes   Diet: Regular (no restrictions)   How many servings of fruit and vegetables per day? (!) 2-3   How many  sweetened beverages each day? 0-1         1/12/2025   Exercise   Days per week of moderate/strenous exercise 3 days   Average minutes spent exercising at this level 30 min         1/12/2025   Social Factors   Frequency of gathering with friends or relatives Once a week   Worry food won't last until get money to buy more No   Food not last or not have enough money for food? No   Do you have housing? (Housing is defined as stable permanent housing and does not include staying ouside in a car, in a tent, in an abandoned building, in an overnight shelter, or couch-surfing.) Yes   Are you worried about losing your housing? No   Lack of transportation? No   Unable to get utilities (heat,electricity)? No         1/12/2025   Dental   Dentist two times every year? Yes         1/12/2025   TB Screening   Were you born outside of the [place]? No         Today's PHQ-2 Score:       1/12/2025     8:22 PM   PHQ-2 ( 1999 [employer])   Q1: Little interest or pleasure in doing things 0   Q2: Feeling down, depressed or hopeless 0   PHQ-2 Score 0    Q1: Little interest or pleasure in doing things Not at all   Q2: Feeling down, depressed or hopeless Not at all   PHQ-2 Score 0       Patient-reported           1/12/2025   Substance Use   Alcohol more than 3/day or more than 7/wk No   Do you use any other substances recreationally? No     Social History     Tobacco Use    Smoking status: Never    Smokeless tobacco: Former     Types: Chew    Tobacco comments:     2-3 tins a month. Quit in May   Vaping Use    Vaping status: Never Used   Substance Use Topics    Alcohol use: Yes     Comment: During the weekends.    Drug use: No           1/12/2025   STI Screening   New sexual partner(s) since last STI/HIV test? No         1/12/2025   Contraception/Family Planning   Questions about contraception or family planning No        Reviewed and updated as needed this visit by Provider   Tobacco  Allergies  Meds  Problems  Med Hx  Surg Hx  Fam Hx         "          Review of Systems  Constitutional, HEENT, cardiovascular, pulmonary, gi and gu systems are negative, except as otherwise noted.     Objective    Exam  /80 (BP Location: Right arm, Patient Position: Chair, Cuff Size: Adult Regular)   Pulse 68   Temp 98  F (36.7  C) (Oral)   Resp 18   Ht 1.735 m (5' 8.31\")   Wt 105.7 kg (233 lb)   SpO2 98%   BMI 35.11 kg/m     Estimated body mass index is 35.11 kg/m  as calculated from the following:    Height as of this encounter: 1.735 m (5' 8.31\").    Weight as of this encounter: 105.7 kg (233 lb).    Physical Exam  GENERAL: alert and no distress  EYES: Eyes grossly normal to inspection, PERRL and conjunctivae and sclerae normal  HENT: ear canals and TM's normal, nose and mouth without ulcers or lesions  NECK: no adenopathy, no asymmetry, masses, or scars  RESP: lungs clear to auscultation - no rales, rhonchi or wheezes  CV: regular rate and rhythm, normal S1 S2, no S3 or S4, no murmur, click or rub, no peripheral edema  ABDOMEN: soft, nontender, no hepatosplenomegaly, no masses and bowel sounds normal  MS: no gross musculoskeletal defects noted, no edema  SKIN: no suspicious lesions or rashes  NEURO: Normal strength and tone, mentation intact and speech normal  PSYCH: mentation appears normal, affect normal/bright        Signed Electronically by: Mario Sebastian,     "

## 2025-01-14 DIAGNOSIS — R73.01 ELEVATED FASTING GLUCOSE: Primary | ICD-10-CM

## 2025-01-14 LAB
EST. AVERAGE GLUCOSE BLD GHB EST-MCNC: 111 MG/DL
HBA1C MFR BLD: 5.5 % (ref 0–5.6)

## 2025-02-03 ENCOUNTER — THERAPY VISIT (OUTPATIENT)
Dept: PHYSICAL THERAPY | Facility: CLINIC | Age: 30
End: 2025-02-03
Payer: COMMERCIAL

## 2025-02-03 DIAGNOSIS — R42 DIZZINESS: Primary | ICD-10-CM

## 2025-02-03 PROCEDURE — 97140 MANUAL THERAPY 1/> REGIONS: CPT | Mod: GP | Performed by: PHYSICAL THERAPIST

## 2025-02-03 PROCEDURE — 97110 THERAPEUTIC EXERCISES: CPT | Mod: GP | Performed by: PHYSICAL THERAPIST

## 2025-02-03 PROCEDURE — 97112 NEUROMUSCULAR REEDUCATION: CPT | Mod: GP | Performed by: PHYSICAL THERAPIST

## 2025-03-10 ENCOUNTER — THERAPY VISIT (OUTPATIENT)
Dept: PHYSICAL THERAPY | Facility: CLINIC | Age: 30
End: 2025-03-10
Payer: COMMERCIAL

## 2025-03-10 DIAGNOSIS — H81.8X2 LEFT-SIDED VESTIBULAR WEAKNESS: ICD-10-CM

## 2025-03-10 DIAGNOSIS — R42 DIZZINESS: ICD-10-CM

## 2025-03-10 PROCEDURE — 97112 NEUROMUSCULAR REEDUCATION: CPT | Mod: GP | Performed by: PHYSICAL THERAPIST

## 2025-03-10 PROCEDURE — 97110 THERAPEUTIC EXERCISES: CPT | Mod: GP | Performed by: PHYSICAL THERAPIST

## 2025-03-10 PROCEDURE — 97140 MANUAL THERAPY 1/> REGIONS: CPT | Mod: GP | Performed by: PHYSICAL THERAPIST

## 2025-04-01 ENCOUNTER — VIRTUAL VISIT (OUTPATIENT)
Dept: FAMILY MEDICINE | Facility: CLINIC | Age: 30
End: 2025-04-01
Payer: COMMERCIAL

## 2025-04-01 DIAGNOSIS — F41.9 ANXIETY: ICD-10-CM

## 2025-04-01 PROCEDURE — 1125F AMNT PAIN NOTED PAIN PRSNT: CPT | Performed by: FAMILY MEDICINE

## 2025-04-01 PROCEDURE — 98004 SYNCH AUDIO-VIDEO EST SF 10: CPT | Performed by: FAMILY MEDICINE

## 2025-04-01 ASSESSMENT — ANXIETY QUESTIONNAIRES
3. WORRYING TOO MUCH ABOUT DIFFERENT THINGS: NOT AT ALL
GAD7 TOTAL SCORE: 0
4. TROUBLE RELAXING: NOT AT ALL
2. NOT BEING ABLE TO STOP OR CONTROL WORRYING: NOT AT ALL
GAD7 TOTAL SCORE: 0
7. FEELING AFRAID AS IF SOMETHING AWFUL MIGHT HAPPEN: NOT AT ALL
1. FEELING NERVOUS, ANXIOUS, OR ON EDGE: NOT AT ALL
GAD7 TOTAL SCORE: 0
7. FEELING AFRAID AS IF SOMETHING AWFUL MIGHT HAPPEN: NOT AT ALL
5. BEING SO RESTLESS THAT IT IS HARD TO SIT STILL: NOT AT ALL
6. BECOMING EASILY ANNOYED OR IRRITABLE: NOT AT ALL

## 2025-04-01 ASSESSMENT — PATIENT HEALTH QUESTIONNAIRE - PHQ9
SUM OF ALL RESPONSES TO PHQ QUESTIONS 1-9: 0
SUM OF ALL RESPONSES TO PHQ QUESTIONS 1-9: 0

## 2025-04-01 ASSESSMENT — ENCOUNTER SYMPTOMS: NERVOUS/ANXIOUS: 1

## 2025-04-01 NOTE — PROGRESS NOTES
Santi is a 29 year old who is being evaluated via a billable video visit.    How would you like to obtain your AVS? MyChart  If the video visit is dropped, the invitation should be resent by: Send to e-mail at: [email or web address]  Will anyone else be joining your video visit? No      Assessment & Plan     Anxiety  Overall doing well on sertraline 50 mg daily. Wishes to continue on current dose. Follow up in 6-12 months or sooner if needed.       The risks, benefits and treatment options of prescribed medications or other treatments have been discussed with the patient. The patient verbalized their understanding and should call or follow up if no improvement or if they develop further problems.    The longitudinal plan of care for the diagnosis(es)/condition(s) as documented were addressed during this visit. Due to the added complexity in care, I will continue to support patient in the subsequent management and with ongoing continuity of care.      Subjective   Santi is a 29 year old, presenting for the following health issues:  Depression and Anxiety      4/1/2025     6:56 AM   Additional Questions   Roomed by Rafaela Ni    History of Present Illness       Mental Health Follow-up:  Patient presents to follow-up on Anxiety.    Patient's anxiety since last visit has been:  Good  The patient is not having other symptoms associated with anxiety.  Any significant life events: other  Patient is not feeling anxious or having panic attacks.  Patient has no concerns about alcohol or drug use.    He eats 0-1 servings of fruits and vegetables daily.He consumes 0 sweetened beverage(s) daily.He exercises with enough effort to increase his heart rate 30 to 60 minutes per day.  He exercises with enough effort to increase his heart rate 3 or less days per week.   He is taking medications regularly.        29 year old male who has a follow up for anxiety   Overall doing well.   Recently had a child 5 weeks.   No panic  attacks.   On sertraline 50 mg daily. No side effects.   No SI or HI.   No alcohol or drug abuse.          Review of Systems  Constitutional, HEENT, cardiovascular, pulmonary, gi and gu systems are negative, except as otherwise noted.      Objective    Vitals - Patient Reported  Pain Score: Mild Pain (2)  Pain Loc: Neck        Physical Exam   GENERAL: alert and no distress  EYES: Eyes grossly normal to inspection.  No discharge or erythema, or obvious scleral/conjunctival abnormalities.  RESP: No audible wheeze, cough, or visible cyanosis.    SKIN: Visible skin clear. No significant rash, abnormal pigmentation or lesions.  NEURO: Cranial nerves grossly intact.  Mentation and speech appropriate for age.  PSYCH: Appropriate affect, tone, and pace of words        Video-Visit Details    Type of service:  Video Visit   Originating Location (pt. Location): Home    Distant Location (provider location):  On-site  Platform used for Video Visit: Mikki  Signed Electronically by: Mario Sebastian DO

## 2025-07-13 ENCOUNTER — MYC MEDICAL ADVICE (OUTPATIENT)
Dept: FAMILY MEDICINE | Facility: CLINIC | Age: 30
End: 2025-07-13
Payer: COMMERCIAL

## 2025-07-14 NOTE — TELEPHONE ENCOUNTER
Attempted to reach patient to: Relay a message    Regarding: Potential tick bite     Action to take: Patient needs in person or UC visit. Ok to relay.     Left detailed message, Mychart sent.    [facility] L[facility] [place]     
Needs urgent care or in person visit to evaluate this.   
30-Jun-2025 12:12